# Patient Record
Sex: FEMALE | Race: BLACK OR AFRICAN AMERICAN | NOT HISPANIC OR LATINO | Employment: FULL TIME | URBAN - METROPOLITAN AREA
[De-identification: names, ages, dates, MRNs, and addresses within clinical notes are randomized per-mention and may not be internally consistent; named-entity substitution may affect disease eponyms.]

---

## 2024-09-18 ENCOUNTER — CLINICAL SUPPORT (OUTPATIENT)
Dept: REHABILITATION | Facility: HOSPITAL | Age: 59
End: 2024-09-18
Payer: COMMERCIAL

## 2024-09-18 DIAGNOSIS — M79.604 PAIN IN BOTH LOWER EXTREMITIES: Primary | ICD-10-CM

## 2024-09-18 DIAGNOSIS — M79.605 PAIN IN BOTH LOWER EXTREMITIES: Primary | ICD-10-CM

## 2024-09-18 PROCEDURE — 97016 VASOPNEUMATIC DEVICE THERAPY: CPT

## 2024-09-18 PROCEDURE — 97140 MANUAL THERAPY 1/> REGIONS: CPT

## 2024-09-18 NOTE — PROGRESS NOTES
Physical Therapy Daily Treatment Note     Name: Mary PEREA [facility] Number: [id number]    Therapy Diagnosis:   Encounter Diagnosis   Name Primary?    Pain in both lower extremities Yes     Physician: Alfred Cooper MD    Visit Date: 9/18/2024    Physician: Alfred Cooper MD     Physician Orders: PT Eval and Treat - lymphedema  Medical Diagnosis from Referral: I89.0 (ICD-10-CM) - Lymphedema, not elsewhere classified I87.2 (ICD-10-CM) - Venous insufficiency (chronic) (peripheral)  Evaluation Date: 8/14/2024  Authorization Period Expiration: 12/31/2024  Plan of Care Expiration: 10/14/2024  Visit # / Visits authorized: 1/ 12     Time In: 12:45pm   Time Out: 1:30pm   Total Billable Time: 45 minutes     Precautions: Standard    Subjective     Pt reports: She is doing ok, she can't do the bandaging today     She doesn't have pain, just a numbing and tingling.  She was compliant with home exercise program.  Response to previous treatment: eval only   Functional change: none     Pain: 0/10  Location: bilateral lower legs     Objective       Treatment:   Mary received the following manual therapy techniques:- Manual Lymphatic Drainage were applied to the: BLE for 45 minutes, including: MLD and short stretch compression bandaging       MANUAL LYMPHATIC DRAINAGE (MLD):    While supine with LEs elevated stimulation at terminus, along GI region, B inguinal regions, drainage of entire R LE mayo lower leg, ankle, and foot with return proximally,  Use of Aquaphor due to dryness.   Educated in self massage to abdominal areas, B inguinal areas, thigh, and remaining LE within reach.    SEQUENTIAL COMPRESSION PUMP: full leg sleeve applied to L LE  Biotab with default setting with distal pressures starting at 45mmHg entire LE x 45 minutes    MULTILAYERED BANDAGING:  not performed       Home Exercises Provided and Patient Education Provided     Education provided:     PATIENT/FAMILY Education: bandaging wear schedule,   HEP,  Beginning of self massage,  Self or assisted bandaging, compression options, and Risk reduction    Written Home Exercises Provided: Patient instructed to cont prior HEP.  Exercises were reviewed and Mary was able to demonstrate them prior to the end of the session.  Mary demonstrated good  understanding of the education provided.       Assessment     Pt presents with increased swelling of the LLE. Pt's LLE was pumped and RLE was massaged. Pt is interested in obtaining thigh high compression so orders will be requested to pt's MD. Will continue to progress       Mary Is progressing well towards her goals.   Pt prognosis is Good.     Pt will continue to benefit from skilled outpatient physical therapy to address the deficits listed in the problem list box on initial evaluation, provide pt/family education and to maximize pt's level of independence in the home and community environment.     Pt's spiritual, cultural and educational needs considered and pt agreeable to plan of care and goals.     Anticipated barriers to physical therapy: none     Goals:     Short Term Goals: (6 weeks)  1. Patient will show decreased girth in B LE by up to 1 cm to allow for LE symmetry, shoe and clothing choice, and ability to apply needed compression.  (progressing, not met)   2. Patient will demonstrate 100% knowledge of lymphedema precautions and signs of infection to allow for reduced lymphedema risk, infection risk, and/or exacerbation of condition.  (progressing, not met)  3. Patient or caregiver will perform self-bandaging techniques and/or wearing of compression garments to allow for lymphatic drainage support, skin elasticity, and reduction in shape and size of limb. (progressing, not met)  4. Patient will perform self lymph drainage techniques to areas within reach to enhance lymphatic drainage and skin condition.  (progressing, not met)  5. Patient will tolerate daily activities with multilayered bandaging to allow  for lymphatic and venous support.  (progressing, not met)     Long Term Goals: (12  weeks)  1. Patient will show decreased girth in B LE by up to 2 cm  to allow for LE symmetry, shoe and clothing choice, and ability to apply needed compression daily.  (progressing, not met)  2. Patient will show reduction in density to mild or less with improved contour of limb to allow for cosmesis, LE symmetry, infection risk reduction, and clothing and compression choice.   (progressing, not met)  3. Patient to george/doff compression garment with daily compliance to assist in lymphedema management, skin elasticity, and tissue density.  (progressing, not met)  4. Pt to show improved postural awareness and alignment.  (progressing, not met)  5. Pt to be I and compliant with HEP to allow for increased function in affected limb.   (progressing, not met)    Plan   Continue PT  2x   weekly for Complete Decongestive Therapy:  Manual lymphatic drainage, Multilayered short stretch bandaging, Pneumatic compression, Therapeutic exercises, Patient education as deemed necessary to achieve stated goals.      Shirley Jeffers, PT

## 2024-09-24 ENCOUNTER — CLINICAL SUPPORT (OUTPATIENT)
Dept: REHABILITATION | Facility: HOSPITAL | Age: 59
End: 2024-09-24
Payer: COMMERCIAL

## 2024-09-24 DIAGNOSIS — M79.605 PAIN IN BOTH LOWER EXTREMITIES: Primary | ICD-10-CM

## 2024-09-24 DIAGNOSIS — M79.604 PAIN IN BOTH LOWER EXTREMITIES: Primary | ICD-10-CM

## 2024-09-24 PROCEDURE — 29581 APPL MULTLAYER CMPRN SYS LEG: CPT

## 2024-09-24 PROCEDURE — 97140 MANUAL THERAPY 1/> REGIONS: CPT

## 2024-09-24 PROCEDURE — 97016 VASOPNEUMATIC DEVICE THERAPY: CPT

## 2024-09-25 ENCOUNTER — CLINICAL SUPPORT (OUTPATIENT)
Dept: REHABILITATION | Facility: HOSPITAL | Age: 59
End: 2024-09-25
Payer: COMMERCIAL

## 2024-09-25 DIAGNOSIS — M79.604 PAIN IN BOTH LOWER EXTREMITIES: Primary | ICD-10-CM

## 2024-09-25 DIAGNOSIS — M79.605 PAIN IN BOTH LOWER EXTREMITIES: Primary | ICD-10-CM

## 2024-09-25 PROCEDURE — 97016 VASOPNEUMATIC DEVICE THERAPY: CPT

## 2024-09-25 PROCEDURE — 97140 MANUAL THERAPY 1/> REGIONS: CPT

## 2024-09-25 PROCEDURE — 29581 APPL MULTLAYER CMPRN SYS LEG: CPT

## 2024-09-25 NOTE — PROGRESS NOTES
Physical Therapy Daily Treatment Note     Name: Mary PEREA [facility] Number: [id number]    Therapy Diagnosis:   Encounter Diagnosis   Name Primary?    Pain in both lower extremities Yes       Physician: Alfred Cooper MD    Visit Date: 9/25/2024    Physician: Alfred Cooper MD     Physician Orders: PT Eval and Treat - lymphedema  Medical Diagnosis from Referral: I89.0 (ICD-10-CM) - Lymphedema, not elsewhere classified I87.2 (ICD-10-CM) - Venous insufficiency (chronic) (peripheral)  Evaluation Date: 8/14/2024  Authorization Period Expiration: 12/31/2024  Plan of Care Expiration: 10/14/2024  Visit # / Visits authorized: 3/ 12     Time In: 1:20pm  Time Out: 2:17pm  Total Billable Time: 57 minutes     Precautions: Standard    Subjective     Pt reports: Did ok with bandaging     She doesn't have pain, just a numbing and tingling.  She was compliant with home exercise program.  Response to previous treatment: eval only   Functional change: none     Pain: 0/10  Location: bilateral lower legs     Objective       Treatment:   Mary received the following manual therapy techniques:- Manual Lymphatic Drainage were applied to the: BLE for 57 minutes, including: MLD and short stretch compression bandaging       MANUAL LYMPHATIC DRAINAGE (MLD):    While supine with LEs elevated stimulation at terminus, along GI region, B inguinal regions, drainage of entire L LE mayo lower leg, ankle, and foot with return proximally,  Use of Aquaphor due to dryness.   Educated in self massage to abdominal areas, B inguinal areas, thigh, and remaining LE within reach.    SEQUENTIAL COMPRESSION PUMP: full leg sleeve applied to R LE  Biotab with default setting with distal pressures starting at 45mmHg entire LE x 45 minutes    MULTILAYERED BANDAGING:  issued supplies and bandaged L LE with cotton stockinette, , 2 cellona rolls ankle to thigh, 1-8cm and 2- 10cm Rosidal K rolls foot to thigh, to leave intact 12-24 hrs as tolerated,  discontinue with any problems, return rolled bandages next session. Wash and wear schedules confirmed.     Home Exercises Provided and Patient Education Provided     Education provided:     PATIENT/FAMILY Education: bandaging wear schedule,  HEP,  Beginning of self massage,  Self or assisted bandaging, compression options, and Risk reduction    Written Home Exercises Provided: Patient instructed to cont prior HEP.  Exercises were reviewed and Mary was able to demonstrate them prior to the end of the session.  Mary demonstrated good  understanding of the education provided.       Assessment     Pt presents with increased swelling of the LLE. Pt's RLE was pumped and LLE was massaged. Pt is interested in obtaining thigh high compression so orders will be requested to pt's MD. Will continue to progress       Mary Is progressing well towards her goals.   Pt prognosis is Good.     Pt will continue to benefit from skilled outpatient physical therapy to address the deficits listed in the problem list box on initial evaluation, provide pt/family education and to maximize pt's level of independence in the home and community environment.     Pt's spiritual, cultural and educational needs considered and pt agreeable to plan of care and goals.     Anticipated barriers to physical therapy: none     Goals:     Short Term Goals: (6 weeks)  1. Patient will show decreased girth in B LE by up to 1 cm to allow for LE symmetry, shoe and clothing choice, and ability to apply needed compression.  (progressing, not met)   2. Patient will demonstrate 100% knowledge of lymphedema precautions and signs of infection to allow for reduced lymphedema risk, infection risk, and/or exacerbation of condition.  (progressing, not met)  3. Patient or caregiver will perform self-bandaging techniques and/or wearing of compression garments to allow for lymphatic drainage support, skin elasticity, and reduction in shape and size of limb. (progressing,  not met)  4. Patient will perform self lymph drainage techniques to areas within reach to enhance lymphatic drainage and skin condition.  (progressing, not met)  5. Patient will tolerate daily activities with multilayered bandaging to allow for lymphatic and venous support.  (progressing, not met)     Long Term Goals: (12  weeks)  1. Patient will show decreased girth in B LE by up to 2 cm  to allow for LE symmetry, shoe and clothing choice, and ability to apply needed compression daily.  (progressing, not met)  2. Patient will show reduction in density to mild or less with improved contour of limb to allow for cosmesis, LE symmetry, infection risk reduction, and clothing and compression choice.   (progressing, not met)  3. Patient to george/doff compression garment with daily compliance to assist in lymphedema management, skin elasticity, and tissue density.  (progressing, not met)  4. Pt to show improved postural awareness and alignment.  (progressing, not met)  5. Pt to be I and compliant with HEP to allow for increased function in affected limb.   (progressing, not met)    Plan   Continue PT  2x   weekly for Complete Decongestive Therapy:  Manual lymphatic drainage, Multilayered short stretch bandaging, Pneumatic compression, Therapeutic exercises, Patient education as deemed necessary to achieve stated goals.      Shirley Jeffers, PT

## 2024-10-01 ENCOUNTER — PATIENT OUTREACH (OUTPATIENT)
Dept: ADMINISTRATIVE | Facility: HOSPITAL | Age: 59
End: 2024-10-01
Payer: COMMERCIAL

## 2024-10-01 ENCOUNTER — CLINICAL SUPPORT (OUTPATIENT)
Dept: REHABILITATION | Facility: HOSPITAL | Age: 59
End: 2024-10-01
Payer: COMMERCIAL

## 2024-10-01 DIAGNOSIS — M79.605 PAIN IN BOTH LOWER EXTREMITIES: Primary | ICD-10-CM

## 2024-10-01 DIAGNOSIS — I89.0 LYMPHEDEMA: Primary | ICD-10-CM

## 2024-10-01 DIAGNOSIS — M79.604 PAIN IN BOTH LOWER EXTREMITIES: Primary | ICD-10-CM

## 2024-10-01 PROCEDURE — 97016 VASOPNEUMATIC DEVICE THERAPY: CPT

## 2024-10-01 PROCEDURE — 97140 MANUAL THERAPY 1/> REGIONS: CPT

## 2024-10-01 NOTE — PROGRESS NOTES
Physical Therapy Daily Treatment Note/ Progress Note      Name: Mary Elder [facility] Number: [id number]    Therapy Diagnosis:   Encounter Diagnosis   Name Primary?    Pain in both lower extremities Yes         Physician: Alfred Cooper MD    Visit Date: 10/1/2024    Physician: Alfred Cooper MD     Physician Orders: PT Eval and Treat - lymphedema  Medical Diagnosis from Referral: I89.0 (ICD-10-CM) - Lymphedema, not elsewhere classified I87.2 (ICD-10-CM) - Venous insufficiency (chronic) (peripheral)  Evaluation Date: 8/14/2024  Authorization Period Expiration: 12/31/2024  Plan of Care Expiration: 10/14/2024  Visit # / Visits authorized: 4/ 12     Time In: 2:03pm   Time Out: 3:00pm   Total Billable Time: 57 minutes     Precautions: Standard    Subjective     Pt reports: She had to go to work after her last visit with the bandages and she started having the bandages fall around 6:30. When she took off the bandages, her legs looked a little smaller.     She doesn't have pain, just a numbing and tingling.  She was compliant with home exercise program.  Response to previous treatment: eval only   Functional change: none     Pain: 0/10  Location: bilateral lower legs     Objective       10/1/2024:       Treatment:   Mary received the following manual therapy techniques:- Manual Lymphatic Drainage were applied to the: BLE for 57 minutes, including: MLD and short stretch compression bandaging       MANUAL LYMPHATIC DRAINAGE (MLD):    While supine with LEs elevated stimulation at terminus, along GI region, B inguinal regions, drainage of entire L LE mayo lower leg, ankle, and foot with return proximally,  Use of Aquaphor due to dryness.   Educated in self massage to abdominal areas, B inguinal areas, thigh, and remaining LE within reach.    SEQUENTIAL COMPRESSION PUMP: full leg sleeve applied to R LE  Biotab with default setting with distal pressures starting at 45mmHg entire LE x 45  minutes    MULTILAYERED BANDAGING:  not performed     Home Exercises Provided and Patient Education Provided     Education provided:     PATIENT/FAMILY Education: bandaging wear schedule,  HEP,  Beginning of self massage,  Self or assisted bandaging, compression options, and Risk reduction    Written Home Exercises Provided: Patient instructed to cont prior HEP.  Exercises were reviewed and Mary was able to demonstrate them prior to the end of the session.  Mary demonstrated good  understanding of the education provided.       Assessment     Pt presents with increased swelling of the LLE. Pt's RLE was pumped and LLE was massaged. Measurements taken today show excellent reductions of the LLE so far. Pt is interested in obtaining thigh high compression and orders have been sent to fitters.  Will continue to progress       Mary Is progressing well towards her goals.   Pt prognosis is Good.     Pt will continue to benefit from skilled outpatient physical therapy to address the deficits listed in the problem list box on initial evaluation, provide pt/family education and to maximize pt's level of independence in the home and community environment.     Pt's spiritual, cultural and educational needs considered and pt agreeable to plan of care and goals.     Anticipated barriers to physical therapy: none     Goals:     Short Term Goals: (6 weeks)  1. Patient will show decreased girth in B LE by up to 1 cm to allow for LE symmetry, shoe and clothing choice, and ability to apply needed compression.  MET   2. Patient will demonstrate 100% knowledge of lymphedema precautions and signs of infection to allow for reduced lymphedema risk, infection risk, and/or exacerbation of condition.  MET   3. Patient or caregiver will perform self-bandaging techniques and/or wearing of compression garments to allow for lymphatic drainage support, skin elasticity, and reduction in shape and size of limb. (progressing, not met)  4. Patient  will perform self lymph drainage techniques to areas within reach to enhance lymphatic drainage and skin condition.  (progressing, not met)  5. Patient will tolerate daily activities with multilayered bandaging to allow for lymphatic and venous support.  (progressing, not met)     Long Term Goals: (12  weeks)  1. Patient will show decreased girth in B LE by up to 2 cm  to allow for LE symmetry, shoe and clothing choice, and ability to apply needed compression daily.  (progressing, not met)  2. Patient will show reduction in density to mild or less with improved contour of limb to allow for cosmesis, LE symmetry, infection risk reduction, and clothing and compression choice.   (progressing, not met)  3. Patient to george/doff compression garment with daily compliance to assist in lymphedema management, skin elasticity, and tissue density.  (progressing, not met)  4. Pt to show improved postural awareness and alignment.  (progressing, not met)  5. Pt to be I and compliant with HEP to allow for increased function in affected limb.   (progressing, not met)    Plan   Continue PT  2x   weekly for Complete Decongestive Therapy:  Manual lymphatic drainage, Multilayered short stretch bandaging, Pneumatic compression, Therapeutic exercises, Patient education as deemed necessary to achieve stated goals.      Shirley Jeffers, PT

## 2024-10-01 NOTE — PROGRESS NOTES
Physical Therapy Daily Treatment Note     Name: Mary PEREA [facility] Number: [id number]    Therapy Diagnosis:   Encounter Diagnosis   Name Primary?    Pain in both lower extremities Yes     Physician: Alfred Cooper MD    Visit Date: 9/24/2024    Physician: Alfred Cooper MD     Physician Orders: PT Eval and Treat - lymphedema  Medical Diagnosis from Referral: I89.0 (ICD-10-CM) - Lymphedema, not elsewhere classified I87.2 (ICD-10-CM) - Venous insufficiency (chronic) (peripheral)  Evaluation Date: 8/14/2024  Authorization Period Expiration: 12/31/2024  Plan of Care Expiration: 10/14/2024  Visit # / Visits authorized: 2/ 12     Time In: 4:00pm   Time Out: 4:56pm   Total Billable Time: 56 minutes     Precautions: Standard    Subjective     Pt reports: She is doing ok, swelling continued     She doesn't have pain, just a numbing and tingling.  She was compliant with home exercise program.  Response to previous treatment: eval only   Functional change: none     Pain: 0/10  Location: bilateral lower legs     Objective       Treatment:   Mary received the following manual therapy techniques:- Manual Lymphatic Drainage were applied to the: BLE for 45 minutes, including: MLD and short stretch compression bandaging       MANUAL LYMPHATIC DRAINAGE (MLD):    While supine with LEs elevated stimulation at terminus, along GI region, B inguinal regions, drainage of entire L LE mayo lower leg, ankle, and foot with return proximally,  Use of Aquaphor due to dryness.   Educated in self massage to abdominal areas, B inguinal areas, thigh, and remaining LE within reach.    SEQUENTIAL COMPRESSION PUMP: full leg sleeve applied to R LE  Biotab with default setting with distal pressures starting at 45mmHg entire LE x 45 minutes      MULTILAYERED BANDAGING:  issued supplies and bandaged L LE with cotton stockinette, , 2 cellona rolls ankle to knee, 1-8cm and 2- 10cm Durelast rolls foot to thigh, to leave intact 12-24 hrs  as tolerated, discontinue with any problems, return rolled bandages next session. Wash and wear schedules confirmed.       Home Exercises Provided and Patient Education Provided     Education provided:     PATIENT/FAMILY Education: bandaging wear schedule,  HEP,  Beginning of self massage,  Self or assisted bandaging, compression options, and Risk reduction    Written Home Exercises Provided: Patient instructed to cont prior HEP.  Exercises were reviewed and Mary was able to demonstrate them prior to the end of the session.  Mary demonstrated good  understanding of the education provided.       Assessment     Pt presents with increased swelling of the LLE. Pt's LLE was pumped and RLE was massaged. Pt's LLE was bandaged to the thigh. Will continue to progress       Mary Is progressing well towards her goals.   Pt prognosis is Good.     Pt will continue to benefit from skilled outpatient physical therapy to address the deficits listed in the problem list box on initial evaluation, provide pt/family education and to maximize pt's level of independence in the home and community environment.     Pt's spiritual, cultural and educational needs considered and pt agreeable to plan of care and goals.     Anticipated barriers to physical therapy: none     Goals:     Short Term Goals: (6 weeks)  1. Patient will show decreased girth in B LE by up to 1 cm to allow for LE symmetry, shoe and clothing choice, and ability to apply needed compression.  (progressing, not met)   2. Patient will demonstrate 100% knowledge of lymphedema precautions and signs of infection to allow for reduced lymphedema risk, infection risk, and/or exacerbation of condition.  (progressing, not met)  3. Patient or caregiver will perform self-bandaging techniques and/or wearing of compression garments to allow for lymphatic drainage support, skin elasticity, and reduction in shape and size of limb. (progressing, not met)  4. Patient will perform self  lymph drainage techniques to areas within reach to enhance lymphatic drainage and skin condition.  (progressing, not met)  5. Patient will tolerate daily activities with multilayered bandaging to allow for lymphatic and venous support.  (progressing, not met)     Long Term Goals: (12  weeks)  1. Patient will show decreased girth in B LE by up to 2 cm  to allow for LE symmetry, shoe and clothing choice, and ability to apply needed compression daily.  (progressing, not met)  2. Patient will show reduction in density to mild or less with improved contour of limb to allow for cosmesis, LE symmetry, infection risk reduction, and clothing and compression choice.   (progressing, not met)  3. Patient to george/doff compression garment with daily compliance to assist in lymphedema management, skin elasticity, and tissue density.  (progressing, not met)  4. Pt to show improved postural awareness and alignment.  (progressing, not met)  5. Pt to be I and compliant with HEP to allow for increased function in affected limb.   (progressing, not met)    Plan   Continue PT  2x   weekly for Complete Decongestive Therapy:  Manual lymphatic drainage, Multilayered short stretch bandaging, Pneumatic compression, Therapeutic exercises, Patient education as deemed necessary to achieve stated goals.      Shirley Jeffers, PT

## 2024-10-02 ENCOUNTER — CLINICAL SUPPORT (OUTPATIENT)
Dept: REHABILITATION | Facility: HOSPITAL | Age: 59
End: 2024-10-02
Payer: COMMERCIAL

## 2024-10-02 DIAGNOSIS — M79.605 PAIN IN BOTH LOWER EXTREMITIES: Primary | ICD-10-CM

## 2024-10-02 DIAGNOSIS — M79.604 PAIN IN BOTH LOWER EXTREMITIES: Primary | ICD-10-CM

## 2024-10-02 PROCEDURE — 97016 VASOPNEUMATIC DEVICE THERAPY: CPT

## 2024-10-02 PROCEDURE — 29581 APPL MULTLAYER CMPRN SYS LEG: CPT

## 2024-10-02 PROCEDURE — 97140 MANUAL THERAPY 1/> REGIONS: CPT

## 2024-10-08 NOTE — PROGRESS NOTES
Physical Therapy Daily Treatment Note     Name: Mary PEREA [facility] Number: [id number]    Therapy Diagnosis:   Encounter Diagnosis   Name Primary?    Pain in both lower extremities Yes         Physician: Alfred Cooper MD    Visit Date: 10/2/2024    Physician: Alfred Cooper MD     Physician Orders: PT Eval and Treat - lymphedema  Medical Diagnosis from Referral: I89.0 (ICD-10-CM) - Lymphedema, not elsewhere classified I87.2 (ICD-10-CM) - Venous insufficiency (chronic) (peripheral)  Evaluation Date: 8/14/2024  Authorization Period Expiration: 12/31/2024  Plan of Care Expiration: 10/14/2024  Visit # / Visits authorized: 5/ 12     Time In: 12:35pm   Time Out: 1:20pm   Total Billable Time: 45 minutes     Precautions: Standard    Subjective     Pt reports: She has been wearing knee highs often, did fine after last session   She was compliant with home exercise program.  Response to previous treatment: tolerated well   Functional change: none     Pain: 0/10  Location: bilateral lower legs     Objective       10/1/2024:       Treatment:   Mary received the following manual therapy techniques:- Manual Lymphatic Drainage were applied to the: BLE for 45 minutes, including: MLD and short stretch compression bandaging       MANUAL LYMPHATIC DRAINAGE (MLD):    While supine with LEs elevated stimulation at terminus, along GI region, B inguinal regions, drainage of entire L LE mayo lower leg, ankle, and foot with return proximally,  Use of Aquaphor due to dryness.   Educated in self massage to abdominal areas, B inguinal areas, thigh, and remaining LE within reach.    SEQUENTIAL COMPRESSION PUMP: full leg sleeve applied to R LE  Biotab with default setting with distal pressures starting at 45mmHg entire LE x 45 minutes    MULTILAYERED BANDAGING:     issued supplies and bandaged L LE with cotton stockinette, , 2 cellona rolls ankle to thigh, 1-8cm and 2- 10cm and 1-12cm Rosidal K rolls foot to thigh, to leave  intact 12-24 hrs as tolerated, discontinue with any problems, return rolled bandages next session. Wash and wear schedules confirmed.     Home Exercises Provided and Patient Education Provided     Education provided:     PATIENT/FAMILY Education: bandaging wear schedule,  HEP,  Beginning of self massage,  Self or assisted bandaging, compression options, and Risk reduction    Written Home Exercises Provided: Patient instructed to cont prior HEP.  Exercises were reviewed and Mary was able to demonstrate them prior to the end of the session.  Mary demonstrated good  understanding of the education provided.       Assessment     Pt presents with increased swelling of the LLE. Pt's RLE was pumped and LLE was massaged. Pt is interested in obtaining thigh high compression and orders have been sent to fitters.  Patient's LLE was bandaged to the thigh to address swelling of the LLE. Edemawear size L was applied to the thigh to try to assist with longer fit. Will continue to progress       Mary Is progressing well towards her goals.   Pt prognosis is Good.     Pt will continue to benefit from skilled outpatient physical therapy to address the deficits listed in the problem list box on initial evaluation, provide pt/family education and to maximize pt's level of independence in the home and community environment.     Pt's spiritual, cultural and educational needs considered and pt agreeable to plan of care and goals.     Anticipated barriers to physical therapy: none     Goals:     Short Term Goals: (6 weeks)  1. Patient will show decreased girth in B LE by up to 1 cm to allow for LE symmetry, shoe and clothing choice, and ability to apply needed compression.  MET   2. Patient will demonstrate 100% knowledge of lymphedema precautions and signs of infection to allow for reduced lymphedema risk, infection risk, and/or exacerbation of condition.  MET   3. Patient or caregiver will perform self-bandaging techniques and/or  wearing of compression garments to allow for lymphatic drainage support, skin elasticity, and reduction in shape and size of limb. (progressing, not met)  4. Patient will perform self lymph drainage techniques to areas within reach to enhance lymphatic drainage and skin condition.  (progressing, not met)  5. Patient will tolerate daily activities with multilayered bandaging to allow for lymphatic and venous support.  (progressing, not met)     Long Term Goals: (12  weeks)  1. Patient will show decreased girth in B LE by up to 2 cm  to allow for LE symmetry, shoe and clothing choice, and ability to apply needed compression daily.  (progressing, not met)  2. Patient will show reduction in density to mild or less with improved contour of limb to allow for cosmesis, LE symmetry, infection risk reduction, and clothing and compression choice.   (progressing, not met)  3. Patient to george/doff compression garment with daily compliance to assist in lymphedema management, skin elasticity, and tissue density.  (progressing, not met)  4. Pt to show improved postural awareness and alignment.  (progressing, not met)  5. Pt to be I and compliant with HEP to allow for increased function in affected limb.   (progressing, not met)    Plan   Continue PT  2x   weekly for Complete Decongestive Therapy:  Manual lymphatic drainage, Multilayered short stretch bandaging, Pneumatic compression, Therapeutic exercises, Patient education as deemed necessary to achieve stated goals.      Shirley Jeffers, PT

## 2024-10-09 ENCOUNTER — CLINICAL SUPPORT (OUTPATIENT)
Dept: REHABILITATION | Facility: HOSPITAL | Age: 59
End: 2024-10-09
Payer: COMMERCIAL

## 2024-10-09 DIAGNOSIS — M79.604 PAIN IN BOTH LOWER EXTREMITIES: Primary | ICD-10-CM

## 2024-10-09 DIAGNOSIS — M79.605 PAIN IN BOTH LOWER EXTREMITIES: Primary | ICD-10-CM

## 2024-10-09 PROCEDURE — 97016 VASOPNEUMATIC DEVICE THERAPY: CPT

## 2024-10-09 PROCEDURE — 97140 MANUAL THERAPY 1/> REGIONS: CPT

## 2024-10-10 ENCOUNTER — CLINICAL SUPPORT (OUTPATIENT)
Dept: REHABILITATION | Facility: HOSPITAL | Age: 59
End: 2024-10-10
Payer: COMMERCIAL

## 2024-10-10 DIAGNOSIS — M79.604 PAIN IN BOTH LOWER EXTREMITIES: Primary | ICD-10-CM

## 2024-10-10 DIAGNOSIS — M79.605 PAIN IN BOTH LOWER EXTREMITIES: Primary | ICD-10-CM

## 2024-10-10 PROCEDURE — 97016 VASOPNEUMATIC DEVICE THERAPY: CPT

## 2024-10-10 PROCEDURE — 29581 APPL MULTLAYER CMPRN SYS LEG: CPT

## 2024-10-10 PROCEDURE — 97140 MANUAL THERAPY 1/> REGIONS: CPT

## 2024-10-14 ENCOUNTER — CLINICAL SUPPORT (OUTPATIENT)
Dept: REHABILITATION | Facility: HOSPITAL | Age: 59
End: 2024-10-14
Payer: COMMERCIAL

## 2024-10-14 DIAGNOSIS — M79.605 PAIN IN BOTH LOWER EXTREMITIES: Primary | ICD-10-CM

## 2024-10-14 DIAGNOSIS — M79.604 PAIN IN BOTH LOWER EXTREMITIES: Primary | ICD-10-CM

## 2024-10-14 PROCEDURE — 29581 APPL MULTLAYER CMPRN SYS LEG: CPT

## 2024-10-14 PROCEDURE — 97140 MANUAL THERAPY 1/> REGIONS: CPT

## 2024-10-14 PROCEDURE — 97016 VASOPNEUMATIC DEVICE THERAPY: CPT

## 2024-10-30 ENCOUNTER — CLINICAL SUPPORT (OUTPATIENT)
Dept: REHABILITATION | Facility: HOSPITAL | Age: 59
End: 2024-10-30
Payer: COMMERCIAL

## 2024-10-30 DIAGNOSIS — M79.605 PAIN IN BOTH LOWER EXTREMITIES: Primary | ICD-10-CM

## 2024-10-30 DIAGNOSIS — M79.604 PAIN IN BOTH LOWER EXTREMITIES: Primary | ICD-10-CM

## 2024-10-30 PROCEDURE — 97140 MANUAL THERAPY 1/> REGIONS: CPT

## 2024-12-05 ENCOUNTER — OFFICE VISIT (OUTPATIENT)
Dept: URGENT CARE | Facility: CLINIC | Age: 59
End: 2024-12-05
Payer: COMMERCIAL

## 2024-12-05 VITALS
DIASTOLIC BLOOD PRESSURE: 83 MMHG | RESPIRATION RATE: 19 BRPM | WEIGHT: 205.69 LBS | TEMPERATURE: 98 F | SYSTOLIC BLOOD PRESSURE: 140 MMHG | OXYGEN SATURATION: 97 % | HEART RATE: 63 BPM | HEIGHT: 66 IN | BODY MASS INDEX: 33.06 KG/M2

## 2024-12-05 DIAGNOSIS — S63.641A SPRAIN OF METACARPOPHALANGEAL (MCP) JOINT OF RIGHT THUMB, INITIAL ENCOUNTER: Primary | ICD-10-CM

## 2024-12-05 PROCEDURE — 73140 X-RAY EXAM OF FINGER(S): CPT | Mod: RT,S$GLB,, | Performed by: RADIOLOGY

## 2024-12-05 RX ORDER — IBUPROFEN 600 MG/1
600 TABLET ORAL EVERY 6 HOURS PRN
Qty: 20 TABLET | Refills: 0 | Status: SHIPPED | OUTPATIENT
Start: 2024-12-05 | End: 2024-12-05

## 2024-12-05 RX ORDER — KETOROLAC TROMETHAMINE 30 MG/ML
30 INJECTION, SOLUTION INTRAMUSCULAR; INTRAVENOUS
Status: COMPLETED | OUTPATIENT
Start: 2024-12-05 | End: 2024-12-05

## 2024-12-05 RX ORDER — IBUPROFEN 800 MG/1
800 TABLET ORAL EVERY 6 HOURS PRN
Qty: 20 TABLET | Refills: 0 | Status: SHIPPED | OUTPATIENT
Start: 2024-12-05

## 2024-12-05 RX ADMIN — KETOROLAC TROMETHAMINE 30 MG: 30 INJECTION, SOLUTION INTRAMUSCULAR; INTRAVENOUS at 02:12

## 2024-12-05 NOTE — PROGRESS NOTES
"Subjective:      Patient ID: Mary Healy is a 58 y.o. female.    Vitals:  height is 5' 6" (1.676 m) and weight is 93.3 kg (205 lb 11 oz). Her oral temperature is 98 °F (36.7 °C). Her blood pressure is 140/83 (abnormal) and her pulse is 63. Her respiration is 19 and oxygen saturation is 97%.     Chief Complaint: Hand Pain    58-year-old female here for right thumb pain that started this morning.  She was trying to pull up her spanx when she felt a pulling sensation on the thumb and immediate pain at the MCP joint.  She states that for the past several months, her thumb will "become displaced" and she has to "pull it back." Has not taken anything for the pain.  Denies any numbness or tingling.    Hand Pain   Her dominant hand is their right hand. The incident occurred 3 to 6 hours ago. The incident occurred at home. The injury mechanism is unknown. The pain is present in the right fingers. The quality of the pain is described as aching. The pain does not radiate. The pain is at a severity of 10/10. The pain is severe. The pain has been Constant since the incident. Nothing aggravates the symptoms. She has tried nothing for the symptoms. The treatment provided no relief.       Constitution: Negative for chills and fever.   Respiratory:  Negative for cough.    Musculoskeletal:  Positive for joint pain. Negative for joint swelling.   Skin:  Negative for bruising.      Objective:     Physical Exam   Constitutional: She is oriented to person, place, and time. She appears well-developed.   HENT:   Head: Normocephalic and atraumatic.   Ears:   Right Ear: External ear normal.   Left Ear: External ear normal.   Nose: Nose normal.   Mouth/Throat: Oropharynx is clear and moist.   Eyes: Conjunctivae, EOM and lids are normal.   Neck: Trachea normal and phonation normal. Neck supple.   Musculoskeletal: Normal range of motion.         General: Normal range of motion.        Hands:    Neurological: She is alert and oriented to " person, place, and time.   Skin: Skin is warm, dry and intact.   Psychiatric: Her speech is normal and behavior is normal. Judgment and thought content normal.   Nursing note and vitals reviewed.    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  No fracture or soft tissue swelling.     See above Electronically signed by: Dimitry Roberson Jr Date:    12/05/2024 Time:    15:00       Assessment:     1. Sprain of metacarpophalangeal (MCP) joint of right thumb, initial encounter        Plan:       Sprain of metacarpophalangeal (MCP) joint of right thumb, initial encounter  -     ketorolac injection 30 mg  -     X-Ray Finger 2 or More Views Right; Future; Expected date: 12/05/2024  -     THUMB ORTHOSIS SPLINT UNIVERSAL FOR HOME USE  -     Discontinue: ibuprofen (ADVIL,MOTRIN) 600 MG tablet; Take 1 tablet (600 mg total) by mouth every 6 (six) hours as needed for Pain.  Dispense: 20 tablet; Refill: 0  -     Ambulatory referral/consult to Orthopedics  -     ibuprofen (ADVIL,MOTRIN) 800 MG tablet; Take 1 tablet (800 mg total) by mouth every 6 (six) hours as needed for Pain.  Dispense: 20 tablet; Refill: 0    X-ray shows no acute fracture or dislocation.  Will treat for sprain of MCP joint of right thumb.  NSAIDs for pain.  Patient given thumb spica splint for support and comfort.  Referral to orthopedics to follow up as needed if no improvement.          Patient Instructions   Ibuprofen as needed for pain.    Keep your the thumb in the thumb spica splint as needed for support and comfort.  Attention not to aggravate area    - Follow up with orthopedics in the next 1-2 weeks if not improved or as needed.  You can call [phone] to schedule an appointment with the appropriate provider.     - Go to the ER or seek medical attention immediately if you develop new or worsening symptoms.    - You must understand that you have received an Urgent Care treatment only and that you may be released before all of your medical problems are known or treated.   - You, the patient, will arrange for follow up care as instructed.   - If your condition worsens or fails to improve we recommend that you receive another evaluation at the ER immediately or contact your PCP to discuss your concerns or return here.

## 2024-12-05 NOTE — PATIENT INSTRUCTIONS
Ibuprofen as needed for pain.    Keep your the thumb in the thumb spica splint as needed for support and comfort.  Attention not to aggravate area    - Follow up with orthopedics in the next 1-2 weeks if not improved or as needed.  You can call [phone] to schedule an appointment with the appropriate provider.    - Go to the ER or seek medical attention immediately if you develop new or worsening symptoms.    - You must understand that you have received an Urgent Care treatment only and that you may be released before all of your medical problems are known or treated.   - You, the patient, will arrange for follow up care as instructed.   - If your condition worsens or fails to improve we recommend that you receive another evaluation at the ER immediately or contact your PCP to discuss your concerns or return here.

## 2024-12-11 ENCOUNTER — TELEPHONE (OUTPATIENT)
Dept: ORTHOPEDICS | Facility: CLINIC | Age: 59
End: 2024-12-11
Payer: COMMERCIAL

## 2024-12-12 ENCOUNTER — OFFICE VISIT (OUTPATIENT)
Dept: ORTHOPEDICS | Facility: CLINIC | Age: 59
End: 2024-12-12
Payer: COMMERCIAL

## 2024-12-12 DIAGNOSIS — M77.8 THUMB TENDONITIS: Primary | ICD-10-CM

## 2024-12-12 PROCEDURE — 99204 OFFICE O/P NEW MOD 45 MIN: CPT | Mod: S$GLB,,, | Performed by: SPECIALIST/TECHNOLOGIST

## 2024-12-12 PROCEDURE — 1159F MED LIST DOCD IN RCRD: CPT | Mod: CPTII,S$GLB,, | Performed by: SPECIALIST/TECHNOLOGIST

## 2024-12-12 PROCEDURE — 1160F RVW MEDS BY RX/DR IN RCRD: CPT | Mod: CPTII,S$GLB,, | Performed by: SPECIALIST/TECHNOLOGIST

## 2024-12-12 PROCEDURE — 99999 PR PBB SHADOW E&M-EST. PATIENT-LVL III: CPT | Mod: PBBFAC,,, | Performed by: SPECIALIST/TECHNOLOGIST

## 2024-12-12 NOTE — PROGRESS NOTES
Patient ID: Mary Healy is a 59 y.o. female.    Chief Complaint: Follow-up of the Right Hand    History of Present Illness    CHIEF COMPLAINT:  - Mary presents for evaluation of right thumb pain and possible dislocation that occurred last week.    HPI:  Mary presents with a complaint of right thumb pain that occurred last week, believing her thumb may have been dislocated. She reports this has occurred previously, usually adjusting it herself, but this time she was unable to. Mary describes the initial pain as severe.    She sought care at an urgent care facility on the [facility], where x-rays were taken. The urgent care provider reported no visible dislocation or fracture. She received a Toradol injection for pain and was prescribed ibuprofen. She was given a brace, which increased her pain to 30 on a 0-10 scale. Mary then obtained a different brace from [employer], which has helped reduce the pain.    She reports that the pain usually occurs when putting on compression garments like Spanx or compression socks. She indicates the pain location in the thumb area.    Currently, the patient denies being in pain, attributing this to either the Toradol injection, the brace she wears daily, or a combination of both. She has been constantly massaging the area.    She denies any current pain, numbness, or tingling in the affected thumb. She denies any family history of gout.    MEDICATIONS:  - Toradol: Given as a shot at urgent care for pain relief  - Ibuprofen: Prescribed by urgent care for pain management. She is not taking it regularly    WORK STATUS:  - Works as a   - Job requires extensive use of hands  - Condition likely related to work due to repetitive hand movements and occasional forceful actions      ROS:  ROS as indicated in HPI.          Hand/Wrist Musculoskeletal Exam    Inspection    Right      Erythema: none      Ecchymosis: none      Edema: none      Deformity: none    Left       Erythema: none      Ecchymosis: none      Edema: none      Deformity: none    Palpation    Palpation additional comments: Tenderness to palpation of the dorsum of the MCP joint of the right thumb.    Range of Motion        Range of motion additional comments: Able to make a composite fist.    Neurovascular    Right       Radial pulse: normal      Capillary refill: brisk      Ulnar nerve sensory distribution: normal      Median nerve sensory distribution: normal      Superficial radial nerve sensory distribution: normal    Left       Radial pulse: normal      Capillary refill: brisk      Ulnar nerve sensory distribution: normal      Median nerve sensory distribution: normal      Superficial radial nerve sensory distribution: normal    Neurovascular additional comments:       Special Tests    Special tests additional comments: No laxity present compared bilaterally of the MCP joint of the UCL and RCL ligament tested at 0 and 30°.      Physical Exam    MSK: Hand/Wrist - Right: Right thumb ligament intact. Full function of the right thumb.  IMAGING:  - X-rays thumb: Normal with no evidence of fracture or dislocation           IMAGING  Right Thumb XR  Personal interpretation of the XR reveals no signs of fractures or dislocations      PLAN  Assessment & Plan    - Referred to physical therapy for tendon glides, strengthening exercises, and other modalities such as ultrasound, paraffin wax massage to address thumb tendonitis.  - Follow up in 6 weeks if therapy is not improving symptoms.            Feroz Draper PA-C, ATC  Hand and Upper Extremity   [employer]    This note was generated with the assistance of ambient listening technology. Verbal consent was obtained by the patient and accompanying visitor(s) for the recording of patient appointment to facilitate this note. I attest to having reviewed and edited the generated note for accuracy, though some syntax or spelling errors may persist. Please contact the author of  this note for any clarification.

## 2024-12-30 ENCOUNTER — OFFICE VISIT (OUTPATIENT)
Dept: INTERNAL MEDICINE | Facility: CLINIC | Age: 59
End: 2024-12-30
Payer: COMMERCIAL

## 2024-12-30 VITALS
BODY MASS INDEX: 32.95 KG/M2 | HEIGHT: 66 IN | WEIGHT: 205 LBS | OXYGEN SATURATION: 100 % | HEART RATE: 57 BPM | SYSTOLIC BLOOD PRESSURE: 126 MMHG | DIASTOLIC BLOOD PRESSURE: 78 MMHG

## 2024-12-30 DIAGNOSIS — Z13.1 SCREENING FOR DIABETES MELLITUS: ICD-10-CM

## 2024-12-30 DIAGNOSIS — Z13.220 ENCOUNTER FOR LIPID SCREENING FOR CARDIOVASCULAR DISEASE: ICD-10-CM

## 2024-12-30 DIAGNOSIS — Z13.6 ENCOUNTER FOR LIPID SCREENING FOR CARDIOVASCULAR DISEASE: ICD-10-CM

## 2024-12-30 DIAGNOSIS — Z76.89 ENCOUNTER TO ESTABLISH CARE: Primary | ICD-10-CM

## 2024-12-30 DIAGNOSIS — I87.2 VENOUS INSUFFICIENCY: ICD-10-CM

## 2024-12-30 DIAGNOSIS — Z86.19 HISTORY OF SHINGLES: ICD-10-CM

## 2024-12-30 DIAGNOSIS — I89.0 LYMPHEDEMA: ICD-10-CM

## 2024-12-30 DIAGNOSIS — I10 HYPERTENSION, WELL CONTROLLED: ICD-10-CM

## 2024-12-30 DIAGNOSIS — Z12.11 SCREEN FOR COLON CANCER: ICD-10-CM

## 2024-12-30 DIAGNOSIS — Z00.00 LABORATORY EXAMINATION ORDERED AS PART OF A ROUTINE GENERAL MEDICAL EXAMINATION: ICD-10-CM

## 2024-12-30 DIAGNOSIS — M79.672 LEFT FOOT PAIN: ICD-10-CM

## 2024-12-30 DIAGNOSIS — Z12.31 BREAST CANCER SCREENING BY MAMMOGRAM: ICD-10-CM

## 2024-12-30 PROCEDURE — 3008F BODY MASS INDEX DOCD: CPT | Mod: CPTII,S$GLB,, | Performed by: INTERNAL MEDICINE

## 2024-12-30 PROCEDURE — 3074F SYST BP LT 130 MM HG: CPT | Mod: CPTII,S$GLB,, | Performed by: INTERNAL MEDICINE

## 2024-12-30 PROCEDURE — 99999 PR PBB SHADOW E&M-EST. PATIENT-LVL IV: CPT | Mod: PBBFAC,,, | Performed by: INTERNAL MEDICINE

## 2024-12-30 PROCEDURE — 1159F MED LIST DOCD IN RCRD: CPT | Mod: CPTII,S$GLB,, | Performed by: INTERNAL MEDICINE

## 2024-12-30 PROCEDURE — 1160F RVW MEDS BY RX/DR IN RCRD: CPT | Mod: CPTII,S$GLB,, | Performed by: INTERNAL MEDICINE

## 2024-12-30 PROCEDURE — 3078F DIAST BP <80 MM HG: CPT | Mod: CPTII,S$GLB,, | Performed by: INTERNAL MEDICINE

## 2024-12-30 PROCEDURE — 99396 PREV VISIT EST AGE 40-64: CPT | Mod: S$GLB,,, | Performed by: INTERNAL MEDICINE

## 2024-12-30 RX ORDER — HYDROCHLOROTHIAZIDE 12.5 MG/1
12.5 TABLET ORAL DAILY
Qty: 90 TABLET | Refills: 3 | Status: SHIPPED | OUTPATIENT
Start: 2024-12-30

## 2024-12-30 RX ORDER — DICLOFENAC SODIUM 10 MG/G
GEL TOPICAL 2 TIMES DAILY
Qty: 200 G | Refills: 2 | Status: SHIPPED | OUTPATIENT
Start: 2024-12-30

## 2024-12-30 NOTE — PROGRESS NOTES
Subjective:       Patient ID: Mary Healy is a 59 y.o. female.    Chief Complaint: Establish Care      HPI  Mary Healy is a 59 y.o. year old female with HTN presents for establishment of care.     Review of Systems   Constitutional:  Negative for activity change, appetite change, fatigue, fever and unexpected weight change.   HENT:  Negative for congestion, hearing loss, postnasal drip, sneezing, sore throat, trouble swallowing and voice change.    Eyes:  Negative for pain and discharge.   Respiratory:  Negative for cough, choking, chest tightness, shortness of breath and wheezing.    Cardiovascular:  Negative for chest pain, palpitations and leg swelling.   Gastrointestinal:  Negative for abdominal distention, abdominal pain, blood in stool, constipation, diarrhea, nausea and vomiting.   Endocrine: Negative for polydipsia and polyuria.   Genitourinary:  Negative for difficulty urinating and flank pain.   Musculoskeletal:  Negative for arthralgias, back pain, joint swelling, myalgias and neck pain.   Skin:  Negative for rash.   Neurological:  Negative for dizziness, tremors, seizures, weakness, numbness and headaches.   Psychiatric/Behavioral:  Negative for agitation. The patient is not nervous/anxious.          Past Medical History:   Diagnosis Date    Hypertension         Prior to Admission medications    Medication Sig Start Date End Date Taking? Authorizing Provider   gabapentin (NEURONTIN) 300 MG capsule Take 1 capsule (300 mg total) by mouth 3 (three) times daily. 7/24/24  Yes Nakul New MD   ibuprofen (ADVIL,MOTRIN) 800 MG tablet Take 1 tablet (800 mg total) by mouth every 6 (six) hours as needed for Pain. 12/5/24  Yes Dimitry Stephenson PA-C   triamcinolone acetonide 0.1% (KENALOG) 0.1 % ointment Apply topically 2 (two) times daily. 12/14/20  Yes Mignon Yanes MD   diclofenac sodium (VOLTAREN) 1 % Gel 4 grams to left foot QID- PRN for pain 11/29/22 12/30/24 Yes Chris Rhoades  "KOFFI Oakley MD   hydroCHLOROthiazide (HYDRODIURIL) 12.5 MG Tab TAKE 1 TABLET(12.5 MG) BY MOUTH EVERY DAY 12/7/23 12/30/24 Yes Mercedes Rivas MD   diclofenac sodium (VOLTAREN) 1 % Gel Apply topically 2 (two) times daily. 12/30/24   Simon Wick MD   hydroCHLOROthiazide (HYDRODIURIL) 12.5 MG Tab Take 1 tablet (12.5 mg total) by mouth once daily. 12/30/24   Simon Wick MD   HYDROcodone-acetaminophen (NORCO) 5-325 mg per tablet Take 1 tablet by mouth every 8 (eight) hours as needed for Pain.  Patient not taking: Reported on 8/5/2024 6/22/24   Meredith Quesada PA-C   valACYclovir (VALTREX) 1000 MG tablet Take 1 tablet (1,000 mg total) by mouth 3 (three) times daily. for 7 days 6/22/24 6/29/24  Meredith Quesada PA-C        Past medical history, surgical history, and family medical history reviewed and updated as appropriate.    Medications and allergies reviewed.     Objective:          Vitals:    12/30/24 1349   BP: 126/78   BP Location: Right arm   Patient Position: Sitting   Pulse: (!) 57   SpO2: 100%   Weight: 93 kg (205 lb 0.4 oz)   Height: 5' 6" (1.676 m)     Body mass index is 33.09 kg/m².  Physical Exam  Constitutional:       Appearance: She is well-developed.   HENT:      Head: Normocephalic and atraumatic.   Eyes:      Extraocular Movements: Extraocular movements intact.   Cardiovascular:      Rate and Rhythm: Normal rate and regular rhythm.      Heart sounds: Normal heart sounds.   Pulmonary:      Effort: Pulmonary effort is normal. No respiratory distress.      Breath sounds: Normal breath sounds. No wheezing.   Abdominal:      General: Bowel sounds are normal. There is no distension.      Palpations: Abdomen is soft.      Tenderness: There is no abdominal tenderness.   Musculoskeletal:         General: No tenderness. Normal range of motion.      Cervical back: Normal range of motion.   Skin:     General: Skin is warm and dry.   Neurological:      Mental Status: She is alert and oriented to " person, place, and time.      Cranial Nerves: No cranial nerve deficit.      Deep Tendon Reflexes: Reflexes are normal and symmetric.         Lab Results   Component Value Date    WBC 3.52 (L) 12/14/2022    HGB 12.2 12/14/2022    HCT 39.1 12/14/2022     12/14/2022    CHOL 184 12/14/2022    TRIG 46 12/14/2022    HDL 50 12/14/2022    ALT 8 (L) 10/09/2023    AST 13 10/09/2023     10/09/2023    K 4.0 10/09/2023     10/09/2023    CREATININE 0.9 10/09/2023    BUN 18 10/09/2023    CO2 26 10/09/2023    TSH 1.615 12/14/2022    HGBA1C 6.2 (H) 10/09/2023       Assessment:       1. Venous insufficiency    2. Left foot pain    3. Hypertension, well controlled    4. Lymphedema    5. Breast cancer screening by mammogram    6. Screen for colon cancer due 2028    7. Laboratory examination ordered as part of a routine general medical examination    8. Screening for diabetes mellitus    9. Encounter for lipid screening for cardiovascular disease    10. History of shingles          Plan:     Mary was seen today for establish care.    Diagnoses and all orders for this visit:    Encounter to establish care    Left foot pain  -     diclofenac sodium (VOLTAREN) 1 % Gel; Apply topically 2 (two) times daily.    Hypertension, well controlled  -     hydroCHLOROthiazide (HYDRODIURIL) 12.5 MG Tab; Take 1 tablet (12.5 mg total) by mouth once daily.  -     CBC Auto Differential; Future  -     Comprehensive Metabolic Panel; Future  -     TSH; Future    Venous insufficiency    Lymphedema    Breast cancer screening by mammogram  -     Mammo Digital Screening Bilat; Future    Screen for colon cancer due 2028    Laboratory examination ordered as part of a routine general medical examination  -     CBC Auto Differential; Future  -     Comprehensive Metabolic Panel; Future  -     TSH; Future  -     Hemoglobin A1C; Future  -     Lipid Panel; Future    Screening for diabetes mellitus  -     Hemoglobin A1C; Future    Encounter for lipid  screening for cardiovascular disease  -     Lipid Panel; Future    History of shingles        Health maintenance reviewed with patient.     Follow up in about 1 year (around 12/30/2025).    Simon Wick MD  Internal Medicine / Primary Care  [facility]  12/30/2024

## 2024-12-30 NOTE — PATIENT INSTRUCTIONS
"Schedule fasting labwork  Schedule mammogram after 2/28/2025    Reminder to get your vaccinations - flu, covid-19, shingles    Shingles vaccination at your convenience - "Shingrix" two shot series, 2-6 months apart. Can get here at [facility] or at any local pharmacy.     Refills of your HCTZ and voltaren gel sent to your pharmacy.     Return to clinic in 1 year or sooner if needed.   "

## 2025-01-03 NOTE — PROGRESS NOTES
"                                     [facility]  Occupational Therapy Initial Evaluation     Name: Mary PEREA [facility] (formerly [facility]3] Number: [id number]    Therapy Diagnosis:   Encounter Diagnosis   Name Primary?    Thumb tendonitis Yes     Physician: Luther Draper PA-C    Physician Orders: Eval and Treat  Medical Diagnosis: M77.8 (ICD-10-CM) - Thumb tendonitis   Date/Mechanism of Injury: Injury to R thumb first week of December 2024  Evaluation Date: 1/6/2025  Insurance Authorization Period Expiration: TBD  Plan of Care Certification Period: ***  Date of Return to MD: TBD  Visit # / Visits authorized: 1/1   FOTO: Initial evaluation/***    Precautions:  Standard    Time In:***  Time Out: ***  Total Appointment Time (timed & untimed codes): *** minutes    Subjective     Date of Onset: ***    History of Current Condition/Mechanism of Injury: Mary reports: ***    Falls: ***    Involved Side: ***  Dominant Side: { hand dominance:8176831382}    Mechanism of Injury: ***  Surgical Procedure: ***  Imaging: R hand 12/5/24 X-Ray  "EXAMINATION:  XR FINGER 2 OR MORE VIEWS RIGHT     CLINICAL HISTORY:  R thumb pain at MCP joint x1 day.; Pain in right finger(s)     TECHNIQUE:  XR FINGER 2 OR MORE VIEWS RIGHT     COMPARISON:  None     FINDINGS:  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  No fracture or soft tissue swelling."    Prior Therapy: ***    Pain:  Functional Pain Scale Rating 0-10:   {NUMBERS; 0-10:5044}/10 on average  {NUMBERS; 0-10:5044}/10 at best  {NUMBERS; 0-10:5044}/10 at worst  Location: ***  Description: {Pain Description:50043}  Aggravating Factors: {Causes; Pain:94644}  Easing Factors: {Pain (activities that relieve):70728}    Occupation:  ***  Working presently: {Work history:24393}  Duties: ***    Functional Limitations/Social History:    Previous functional status includes: Independent with all ADLs. ***    Current Functional Status   Home/Living environment: {LIVES WITH:58303}    - *** [place], *** " steps to enter    - DME: ***      Limitation of Functional Status as follows:   ADLs/IADLs:     - Feeding: ***    - Bathing: ***    - Dressing/Grooming: ***    - Home Management: ***    - Driving: ***     Leisure: {AMB OT HAND LEISURE:40347}    Patient's Goals for Therapy: ***    Past Medical History/Physical Systems Review:   Mary Healy  has a past medical history of Hypertension.    Mary Healy  has a past surgical history that includes Tubal ligation (Bilateral, 2007) and Colonoscopy (N/A, 3/19/2018).    Mary has a current medication list which includes the following prescription(s): diclofenac sodium, gabapentin, hydrochlorothiazide, ibuprofen, triamcinolone acetonide 0.1%, and valacyclovir.    Review of patient's allergies indicates:  No Known Allergies     Objective     Observation/Appearance:  {AMB OT HAND OBSERVATION:69091} and ***    Edema. Measured in centimeters.   1/6/2025 1/6/2025    Right  Left    2in. Above elbow *** ***   2in. Below elbow *** ***   Proximal Wrist Crease *** ***   Figure of 8 *** ***   MCPs *** ***     Edema. Measured in centimeters.   1/6/2025 1/6/2025    Right  Left    Index:       P1 *** ***    PIP *** ***   P2 *** ***    DIP *** ***   P3 *** ***   Long:       P1 *** ***    PIP *** ***   P2 *** ***    DIP *** ***   P3 *** ***   Ring:       P1            *** ***    PIP            *** ***   P2             *** ***    DIP *** ***   P3 *** ***   Small:        P1           *** ***     PIP        *** ***   P2        *** ***    DIP *** ***   P3 *** ***   Thumb:     Prox. Phalanx *** ***   IP *** ***   Distal Phalanx *** ***     Hand/Wrist ROM. Measured in degrees.   1/6/2025 1/6/2025    Right  Left         Index: MP  ***/*** ***/***              PIP     ***/*** ***/***              DIP ***/*** ***/***              LAGUNA *** ***        Long:  MP ***/*** ***/***              PIP ***/*** ***/***              DIP ***/*** ***/***              LAGUNA          Ring:   MP ***/***  ***/***              PIP ***/*** ***/***              DIP ***/*** ***/***              LAGUNA *** ***        Small:  MP ***/*** ***/***               PIP ***/*** ***/***               DIP ***/*** ***/***              LAGUNA *** ***        Thumb: MP ***/*** ***/***                IP ***/*** ***/***       Rad ADD/ABD *** ***       Pal ADD/ABD *** ***          Opposition *** ***     Wrist Flex/Ext: ***/***  Wrist RD/UD: ***/***      Sensation  {AMB OT SENSATION:66609} Distribution 1/6/2025 1/6/2025    Right  Left    [place] Doug     Normal 1.65-2.83     Diminished Light Touch 3.22-3.61     Diminished Protective 3.84-4.31     Loss of Protective 4.56-6.65     Untestable >6.65     2 Point Discrimination     Static     Dynamic       Special Tests:   {Right/Left:15494}   1/6/2025   Thumb CMC Grind Test    Finkelstein's Test    Phalen's Test    Tinel's Test    Perry's Test    Extrinsic Tightness Test    Intrinsic Tightness Test    ORL Test    Froment's Sign    Thelma's Sign     Scaphoid Thrust Test    Linscheid's Test    Metacarpal Stress Test    Piano Key Test    Ulnar Compression Test    TFCC Load Test    Ulnocarpal Stress Test    Midcarpal Shift Test         Strength (Dyanmometer) and Pinch Strength (Pinch Gauge)  Measured in pounds and psi. Average of three trials.   1/6/2025 1/6/2025    Right  Left    Rung II     Molina Pinch     3pt Pinch     2pt Pinch         Manual Muscle Testing    Median Innervated:  Muscle Action 1/6/2025   Pronator Teres C6-7 Pronation {AMB OT SHOULDER STRENGTH:04342}   Flexor Carpi Radialis C6-7 Wrist flex/rd {AMB OT SHOULDER STRENGTH:37595}   Palmaris Longus C7-8 Wrist flexion {AMB OT SHOULDER STRENGTH:63233}   Flexor Digitorum Superficials C7-T1 Index PIP flexion {AMB OT SHOULDER STRENGTH:57562}   Flexor Digitorum Superficials C7-T1 Long PIP flexion {AMB OT SHOULDER STRENGTH:81362}   Flexor Digitorum Superficials C7-T1 Ring PIP flexion {AMB OT SHOULDER STRENGTH:32392}   Flexor Digitorum  Superficials C7-T1 Small PIP flexion {AMB OT SHOULDER STRENGTH:55800}   Anterior Interosseus Branch     Flexor Digitorum Profundus C7-T1 Index DIP Flexion {AMB OT SHOULDER STRENGTH:63191}   Flexor Digitorum Profundus C7-T1 ILong DIP Flexion {AMB OT SHOULDER STRENGTH:15136}   Flexor Longus C8 -T1 IP Flexion {AMB OT SHOULDER STRENGTH:33605}   Pronator Quadratus Pronation {AMB OT SHOULDER STRENGTH:77253}   Digital Branch(Recurrent)     Abductor Pollicis Brevis C6-T1 MP Flexion {AMB OT SHOULDER STRENGTH:85921}   Opponens Pollicis C6-T1 Opposition {AMB OT SHOULDER STRENGTH:16208}   Flexor Pollicis Brevis(suprfl head) C7- T1 MP flexion {AMB OT SHOULDER STRENGTH:53667}   Lumbricals C7-T1 Index MP flexion/IP ext {AMB OT SHOULDER STRENGTH:66757}   Lumbricals C7-T1 Long MP flexion/IP ext {AMB OT SHOULDER STRENGTH:58794}     Ulnar Innervated:  Muscle Action 1/6/2025   Flexor Carpi Ulnaris C7-T1 Wrist flex/ud {AMB OT SHOULDER STRENGTH:57776}   Flexor Digitorum Profundus C8-T1 RIng DIP flex {AMB OT SHOULDER STRENGTH:52830}   Flexor Digitoum Profundus C8-T1 Small DIP flex {AMB OT SHOULDER STRENGTH:76179}   Palmaris Brevis     Abductor Digiti Minimi C8- T1 MP abd {AMB OT SHOULDER STRENGTH:60121}   Opponens Digiti Minimi C8-T1 Opposition {AMB OT SHOULDER STRENGTH:52966}   Flexor Digiti Minimi C8-T1 MP flexion {AMB OT SHOULDER STRENGTH:07897}   Lumbrical C8- T1 Ring MP flexion/IP ext {AMB OT SHOULDER STRENGTH:81600}   Lumbrical C8- T1 Small MP flexion/IP ext {AMB OT SHOULDER STRENGTH:10671}   Palmar Interossei C8 - T1 First Add index {AMB OT SHOULDER STRENGTH:96144}   Palmar Interossei C8 -T1 Second Add ring {AMB OT SHOULDER STRENGTH:57382}   Palmar Interossei C8 - T1 Third Add small {AMB OT SHOULDER STRENGTH:46092}   Dorsal Interossei C8 - T1 First Abd index {AMB OT SHOULDER STRENGTH:27053}   Dorsal Interossei C8 - T1 Second Rad abd long {AMB OT SHOULDER STRENGTH:60970}   Dorsal Interossei C8 - T1 Third Uln abd long {AMB OT  "SHOULDER STRENGTH:28835}   Dorsal Interossei C8 - T1 Fourth Abd ring {AMB OT SHOULDER STRENGTH:78130}   Flexor Pollicis Brevis C7 - T1(deep head) Thumb MP flex {AMB OT SHOULDER STRENGTH:50802}   Adductor Pollicis C8- T1 Add Thumb {AMB OT SHOULDER STRENGTH:57880}     Radial Nerve Innervated:  Muscle Action 1/6/2025   Triceps C7-8 Elbow extension {AMB OT SHOULDER STRENGTH:44599}   Brachioradialis C5-6(mid -position) Elbow Flexion {AMB OT SHOULDER STRENGTH:82561}   Extensor Carpi Radialis Longus C6-7 Wrist ext/rd {AMB OT SHOULDER STRENGTH:54332}   Posterior Interosseous Branch     Extensor Carpi Radialis Brevis C6-7 Wrist extension {AMB OT SHOULDER STRENGTH:15232}   Supinator C6 Supination {AMB OT SHOULDER STRENGTH:89027}   Extensor Digitorum Communis C6-8 Index MP extension {AMB OT SHOULDER STRENGTH:74516}   Extensor Digitorum Communis C6-8 Long MP extension {AMB OT SHOULDER STRENGTH:02163}   Extensor Digitorum Communis C6-8 Ring MP extension {AMB OT SHOULDER STRENGTH:14667}   Extensor Digitorum Communis C6-8 Small MP extension {AMB OT SHOULDER STRENGTH:67255}   Extensor Digiti Minimi(small) MP extension {AMB OT SHOULDER STRENGTH:25665}   Extensor Carpi Ulnaris C6-8 Wrist ext/ud {AMB OT SHOULDER STRENGTH:44019}   Abductor Pollicis Longus C6-8 MP abd {AMB OT SHOULDER STRENGTH:25671}   Extensor Pollicis Longus C6-8 MP ext {AMB OT SHOULDER STRENGTH:18427}   Extensor Pollicis Brevis C6-8 Lt thumb off table {AMB OT SHOULDER STRENGTH:50194}   Extensor Indicis Proprius C6-8 MP ext {AMB OT SHOULDER STRENGTH:77304}       CMS Impairment/Limitation/Restriction for FOTO *** Survey    Therapist reviewed FOTO scores for Mary Healy on 1/6/2025.   FOTO documents entered into EPIC - see Media section.    Limitation Score: ***%  Category: {Blank single:49719::"Other","Self Care","Body Position","Carrying","Mobility"}         Treatment     Total Treatment time separate from Evaluation time:***    Mary received the treatments " "listed below:      Therapeutic exercises to develop {AMB PT PROGRESS OBJECTIVE:11388} for *** minutes including:  ***    Manual therapy techniques: {AMB PT PROGRESS MANUAL THERAPY:96574} were applied to the: *** for *** minutes, including:  ***    Neuromuscular re-education activities to improve: {AMB PT PROGRESS NEURO RE-ED:75324} for *** minutes. The following activities were included:  ***    Therapeutic activities to improve functional performance for ***  minutes, including:  ***      Direct contact modalities after being cleared for contraindications: {AMB PT PROGRESS DIRECT CONTACT MODES:19103}    Supervised modalities after being cleared for contradictions: {AMB PT SUPERVISED MODES:16039}    hot pack for *** minutes to ***.    cold pack for *** minutes to ***.    Patient Education and Home Exercises      Education provided:   -Role of OT, goals for OT, scheduling/cancellations, insurance limitations with patient.  -Additional Education provided: ***    Written Home Exercises Provided: {Blank single:75021::"yes","Patient instructed to cont prior HEP"}.  Exercises were reviewed and Mary was able to demonstrate them prior to the end of the session.    Mary demonstrated {Desc; good/fair/poor:84474} understanding of the education provided.     Pt was advised to perform these exercises free of pain, and to stop performing them if pain occurs.    See EMR under {Blank single:82881::"Media","Patient Instructions"} for exercises provided {Blank single:15430::"12/17/2024","prior visit"}.    Assessment     Mary Healy is a 59 y.o. female referred to outpatient occupational therapy and presents with a medical diagnosis of ***.    Following medical record review it is determined that pt will benefit from occupational therapy services in order to maximize pain free and/or functional use of {LEFT/RIGHT:33489} ***. The following goals were discussed with the patient and patient is in agreement with them as to be " addressed in the treatment plan. The patient's rehab potential is {REHAB PROGNOSIS OHS:76648}.     Anticipated barriers to Occupational Therapy: ***    Plan of care discussed with patient: {YES:38870}  Patient's spiritual, cultural and educational needs considered and patient is agreeable to the plan of care and goals as stated below:     Medical Necessity is demonstrated by the following  Occupational Profile/History  Co-morbidities and personal factors that may impact the plan of care [] LOW: Brief chart review  [] MODERATE: Expanded chart review   [] HIGH: Extensive chart review    Moderate / High Support Documentation: ***     Examination  Performance deficits relating to physical, cognitive or psychosocial skills that result in activity limitations and/or participation restrictions  [] LOW: addressing 1-3 Performance deficits  [] MODERATE: 3-5 Performance deficits  [] HIGH: 5+ Performance deficits (please support below)    Moderate / High Support Documentation:    Physical:  {Physical:69630}    Cognitive:  {Cognitive:42704}    Psychosocial:    {Psychosocial:95524}     Treatment Options [] LOW: Limited options  [] MODERATE: Several options  [] HIGH: Multiple options      Decision Making/ Complexity Score: {Desc; low/moderate/high:978027}       The following goals were discussed with the patient and patient is in agreement with them as to be addressed in the treatment plan.       ShortTerm Goals (to be met in *** weeks):   1. Patient to be IND and compliant with HEP & attendance for duration of therapy.  2. Patient will demonstrate increased LAGUNA in *** by *** degrees to restore functional hand use for ***.   3. Patient will demonstrate increased *** hand  strength by *** lbs. to improve functional grasp for ADLs/work/leisure activities.   4. Patient will demonstrate increased *** pinch by *** psi's to restore IND with fine motor activities.  5. Patient will report no more than ***/10 pain in *** hand.  "      Long Term Goals (to be met in *** weeks):   1. Patient to be IND and compliant with HEP & attendance for duration of therapy.  2. Patient will demonstrate increased LAGUNA in *** by *** degrees to increase functional hand use for ***.   3. Patient will demonstrate increased *** hand  strength by *** lbs. to restore functional grasp for ADLs/work/leisure activities.   4. Patient will demonstrate increased *** pinch by *** psi's to restore IND with fine motor activities.  5. Patient will report increase in functional use of *** hand by ***% as evidenced by the FOTO outcome measure.   6. Patient will report no more than ***/10 pain in *** hand.     Plan   Certification Period/Plan of care expiration: 1/6/2025 to ***.    Outpatient Occupational Therapy {NUMBERS 1-5:86513} times weekly for {0-10:76621::"0"} weeks to include the following interventions: {AMB OT HAND TX:53448}.        Thank you for allowing me to assist in the care of your Patient. If you have any questions or concerns, please don't hesitate to e-mail or contact me directly.      DEYSI Diaz/L  [facility]   Phone: [phone]  Fax: [phone]         "

## 2025-01-04 ENCOUNTER — PATIENT MESSAGE (OUTPATIENT)
Dept: ORTHOPEDICS | Facility: CLINIC | Age: 60
End: 2025-01-04
Payer: COMMERCIAL

## 2025-01-04 DIAGNOSIS — M77.8 THUMB TENDONITIS: Primary | ICD-10-CM

## 2025-01-04 DIAGNOSIS — M79.644 THUMB PAIN, RIGHT: ICD-10-CM

## 2025-01-04 DIAGNOSIS — M65.4 RADIAL STYLOID TENOSYNOVITIS (DE QUERVAIN): ICD-10-CM

## 2025-01-06 ENCOUNTER — LAB VISIT (OUTPATIENT)
Dept: LAB | Facility: HOSPITAL | Age: 60
End: 2025-01-06
Attending: INTERNAL MEDICINE
Payer: COMMERCIAL

## 2025-01-06 ENCOUNTER — CLINICAL SUPPORT (OUTPATIENT)
Dept: REHABILITATION | Facility: HOSPITAL | Age: 60
End: 2025-01-06
Payer: COMMERCIAL

## 2025-01-06 DIAGNOSIS — M25.641 DECREASED RANGE OF MOTION OF RIGHT THUMB: ICD-10-CM

## 2025-01-06 DIAGNOSIS — Z13.6 ENCOUNTER FOR LIPID SCREENING FOR CARDIOVASCULAR DISEASE: ICD-10-CM

## 2025-01-06 DIAGNOSIS — Z13.220 ENCOUNTER FOR LIPID SCREENING FOR CARDIOVASCULAR DISEASE: ICD-10-CM

## 2025-01-06 DIAGNOSIS — Z00.00 LABORATORY EXAMINATION ORDERED AS PART OF A ROUTINE GENERAL MEDICAL EXAMINATION: ICD-10-CM

## 2025-01-06 DIAGNOSIS — R29.898 DECREASED GRIP STRENGTH OF RIGHT HAND: ICD-10-CM

## 2025-01-06 DIAGNOSIS — M77.8 THUMB TENDONITIS: Primary | ICD-10-CM

## 2025-01-06 DIAGNOSIS — Z13.1 SCREENING FOR DIABETES MELLITUS: ICD-10-CM

## 2025-01-06 DIAGNOSIS — Z74.1 SELF-CARE DEFICIT IN DRESSING: ICD-10-CM

## 2025-01-06 DIAGNOSIS — I10 HYPERTENSION, WELL CONTROLLED: ICD-10-CM

## 2025-01-06 DIAGNOSIS — R29.898 DECREASED PINCH STRENGTH: ICD-10-CM

## 2025-01-06 LAB
ALBUMIN SERPL BCP-MCNC: 3.5 G/DL (ref 3.5–5.2)
ALP SERPL-CCNC: 50 U/L (ref 40–150)
ALT SERPL W/O P-5'-P-CCNC: 8 U/L (ref 10–44)
ANION GAP SERPL CALC-SCNC: 10 MMOL/L (ref 8–16)
AST SERPL-CCNC: 12 U/L (ref 10–40)
BASOPHILS # BLD AUTO: 0.02 K/UL (ref 0–0.2)
BASOPHILS NFR BLD: 0.4 % (ref 0–1.9)
BILIRUB SERPL-MCNC: 0.5 MG/DL (ref 0.1–1)
BUN SERPL-MCNC: 12 MG/DL (ref 6–20)
CALCIUM SERPL-MCNC: 9.1 MG/DL (ref 8.7–10.5)
CHLORIDE SERPL-SCNC: 108 MMOL/L (ref 95–110)
CHOLEST SERPL-MCNC: 203 MG/DL (ref 120–199)
CHOLEST/HDLC SERPL: 3.6 {RATIO} (ref 2–5)
CO2 SERPL-SCNC: 23 MMOL/L (ref 23–29)
CREAT SERPL-MCNC: 0.8 MG/DL (ref 0.5–1.4)
DIFFERENTIAL METHOD BLD: ABNORMAL
EOSINOPHIL # BLD AUTO: 0.1 K/UL (ref 0–0.5)
EOSINOPHIL NFR BLD: 1.5 % (ref 0–8)
ERYTHROCYTE [DISTWIDTH] IN BLOOD BY AUTOMATED COUNT: 14.3 % (ref 11.5–14.5)
EST. GFR  (NO RACE VARIABLE): >60 ML/MIN/1.73 M^2
ESTIMATED AVG GLUCOSE: 137 MG/DL (ref 68–131)
GLUCOSE SERPL-MCNC: 104 MG/DL (ref 70–110)
HBA1C MFR BLD: 6.4 % (ref 4–5.6)
HCT VFR BLD AUTO: 41.4 % (ref 37–48.5)
HDLC SERPL-MCNC: 56 MG/DL (ref 40–75)
HDLC SERPL: 27.6 % (ref 20–50)
HGB BLD-MCNC: 12.8 G/DL (ref 12–16)
IMM GRANULOCYTES # BLD AUTO: 0.01 K/UL (ref 0–0.04)
IMM GRANULOCYTES NFR BLD AUTO: 0.2 % (ref 0–0.5)
LDLC SERPL CALC-MCNC: 134.4 MG/DL (ref 63–159)
LYMPHOCYTES # BLD AUTO: 2.1 K/UL (ref 1–4.8)
LYMPHOCYTES NFR BLD: 43.3 % (ref 18–48)
MCH RBC QN AUTO: 26.7 PG (ref 27–31)
MCHC RBC AUTO-ENTMCNC: 30.9 G/DL (ref 32–36)
MCV RBC AUTO: 86 FL (ref 82–98)
MONOCYTES # BLD AUTO: 0.5 K/UL (ref 0.3–1)
MONOCYTES NFR BLD: 9.7 % (ref 4–15)
NEUTROPHILS # BLD AUTO: 2.1 K/UL (ref 1.8–7.7)
NEUTROPHILS NFR BLD: 44.9 % (ref 38–73)
NONHDLC SERPL-MCNC: 147 MG/DL
NRBC BLD-RTO: 0 /100 WBC
PLATELET # BLD AUTO: 252 K/UL (ref 150–450)
PMV BLD AUTO: 9.7 FL (ref 9.2–12.9)
POTASSIUM SERPL-SCNC: 4 MMOL/L (ref 3.5–5.1)
PROT SERPL-MCNC: 7.4 G/DL (ref 6–8.4)
RBC # BLD AUTO: 4.8 M/UL (ref 4–5.4)
SODIUM SERPL-SCNC: 141 MMOL/L (ref 136–145)
TRIGL SERPL-MCNC: 63 MG/DL (ref 30–150)
TSH SERPL DL<=0.005 MIU/L-ACNC: 2.56 UIU/ML (ref 0.4–4)
WBC # BLD AUTO: 4.73 K/UL (ref 3.9–12.7)

## 2025-01-06 PROCEDURE — 97110 THERAPEUTIC EXERCISES: CPT | Mod: PN

## 2025-01-06 PROCEDURE — 83036 HEMOGLOBIN GLYCOSYLATED A1C: CPT | Performed by: INTERNAL MEDICINE

## 2025-01-06 PROCEDURE — 36415 COLL VENOUS BLD VENIPUNCTURE: CPT | Mod: PO | Performed by: INTERNAL MEDICINE

## 2025-01-06 PROCEDURE — 84443 ASSAY THYROID STIM HORMONE: CPT | Performed by: INTERNAL MEDICINE

## 2025-01-06 PROCEDURE — 80061 LIPID PANEL: CPT | Performed by: INTERNAL MEDICINE

## 2025-01-06 PROCEDURE — 85025 COMPLETE CBC W/AUTO DIFF WBC: CPT | Performed by: INTERNAL MEDICINE

## 2025-01-06 PROCEDURE — 80053 COMPREHEN METABOLIC PANEL: CPT | Performed by: INTERNAL MEDICINE

## 2025-01-06 PROCEDURE — 97165 OT EVAL LOW COMPLEX 30 MIN: CPT | Mod: PN

## 2025-01-06 NOTE — PATIENT INSTRUCTIONS
"[facility] - OCCUP[facility]  HOME EXERCISE PROGRAM       Complete the following exercises with 15 repetitions each, 3 x/day.     AROM: Thumb IP Flexion / Extension  Brace thumb below tip joint. Bend joint as far as possible then straighten.    AROM: Thumb Composite Flexion   Bend both joints of thumb as far as possible. Try to touch base of little finger.    AROM: Radial Adduction / Abduction  Place your palm flat on the table. Move thumb out to side. Move back alongside index finger.                                  AROM: Palmar Adduction / Abduction   Rest your small finger on the table. Move thumb sideways, out and away from palm. Move back to rest along palm.                     AROM: Opposition   Touch tip of thumb to nail tip of each finger in turn, making an "O" shape.  AROM: Composite Movement Circumduction  Make clockwise circles with thumb. Reverse and make counterclockwise circles with thumb.                AROM: Composite Flesion ("Pinky Slides")  Touch thumb to tip of small finger. Slide thumb down small finger into palm.     AROM: MP Extension   With palm on table, lift thumb up. Relax and lower thumb.    Therapist: GUME Ruby     Copyright © [facility]. All rights reserved.    "

## 2025-01-06 NOTE — PLAN OF CARE
[facility] Occupational Therapy  Initial Evaluation     Date:  1/6/2025    Name: Mary Healy  Clinic Number: [id number]    Therapy Diagnosis:   1. Thumb tendonitis        2. Decreased range of motion of right thumb        3. Decreased  strength of right hand        4. Decreased pinch strength        5. Self-care deficit in dressing            Physician: Luther Draper PA-C     Physician Orders: eval and treat  Medical Diagnosis:    Thumb tendonitis   Thumb pain, right   Surgical Procedure and Date: n/a  Evaluation Date: 01/06/2025   Insurance Authorization Period Expiration: tbd  Plan of Care Certification Period: 01/06/2025   Date of Return to MD: not appointed    Visit # / Visits authorized: 1 / tbd    Time In: 9:34 am  Time Out: 10:27 am  Total Billable Time: 53 minutes    Precautions:  Standard    Subjective     Involved Side: right  Dominant Side: Right  Date of Onset: 3 - 4 weeks ago  Mechanism of Injury: Pulling up my spanks and twisted my hand and hand severe pain in my thumb  History of Current Condition: pain is still present with her hand and has difficulty with using splint.  Pain is much less but still present   Surgical Procedure: n/a  Imaging: xray   EXAMINATION:  XR FINGER 2 OR MORE VIEWS RIGHT  CLINICAL HISTORY:  R thumb pain at MCP joint x1 day.; Pain in right finger(s)  TECHNIQUE:  XR FINGER 2 OR MORE VIEWS RIGHT  COMPARISON:  None  FINDINGS:  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  No fracture or soft tissue swelling.  Impression:  See above  Electronically signed by:Dimitry Roberson Jr  Date:                                            12/05/2024    Previous Therapy: none    Past Medical History/Physical Systems Review:   Mary Healy  has a past medical history of Hypertension.    Mary Healy  has a past surgical history that includes Tubal ligation (Bilateral, 2007) and Colonoscopy (N/A, 3/19/2018).    Mary has a current medication list which includes the  following prescription(s): diclofenac sodium, gabapentin, hydrochlorothiazide, ibuprofen, triamcinolone acetonide 0.1%, and valacyclovir.    Review of patient's allergies indicates:  No Known Allergies     Patient's Goals for Therapy: To strengthen my thumb and no pain.    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    Functional Limitations/Social History:    Previous functional status includes: Independent with all ADLs.     Current FunctionalStatus   Home/Living environment : lives with their spouse      Limitation of Functional Status as follows:   ADLs/IADLs:     - Feeding: I    - Bathing: I  eggresing modified    - Dressing/Grooming: difficulty with spanks    - Driving: I     Leisure:  reality shows    Objective    Observation: Skin intact, no swelling or abnormalities noted    Sensation: Ulnar / Median Nerve Intact  [place] Doug Monofilament Test: No  Stereognosis: Intact    Special Tests:   Finkelstein's Test      Edema: Circumferential measurements: as follows:   MP's: left 18.8 right 19.3 cm  Proximal Wrist Crease: left 16.3  right 17  cm    Range of Motion: right and left Active  (Ext/Flex) Thumb left  Thumb right \     MP 0/49 0/41      DIP 0/77 0/64      LAGUNA 126 105        Thumb Opposition: to all tips and 5th MC head  Palmar Abduction: left 45  right 50  Radial Abduction: left 55  right 50    Wrist Ext/Flex: symmetric/symmetric  Wrist RD/UD: symmetric /symmetric  Supination/Pronation: symmetric/symmetric     Strength: (CHEN Dynamometer in lbs.) Average 3 trials, Position II  Right: 40.3#  Left: 44.0#    Pinch Strength: (Pinch Gauge in psi's),  Average 3 trials  Key Pinch R) 18.0 psi's   L) 15.6 psi's  3pt Pinch   R) 11 psi's  L) 11.3 psi's    Fine [place] Coordination Tests:   9 hole Peg Test R) 25 seconds 1 drop   L) 24 seconds no drops      Intake Outcome Measure  for FOTO hand  Survey    Therapist reviewed FOTO scores for Mary Healy on 1/6/2025.   FOTO documents entered into [employer] - see Media section.    Intake Score: 70%         Treatment     Treatment Time In: 10:17 am  Treatment Time Out: 10:27 am  Total Treatment time separate from Evaluation time:10    Mary received therapeutic exercises for 10 minutes including:  -performance of all exercises of her home program x 5 reps each    Home Exercise Program/Education:  Issued HEP (see patient instructions in EMR) and educated on modality use for pain management . Exercises were reviewed and Mary was able to demonstrate them prior to the end of the session.   Pt received a written copy of exercises to perform at home. Mary demonstrated good  understanding of the education provided.  Pt was advised to perform these exercises free of pain, and to stop performing them if pain occurs.    Patient/Family Education: role of OT, goals for OT, scheduling/cancellations - pt verbalized understanding. Discussed insurance limitations with patient.    Additional Education provided: use of ice or heat for 5 - 7 minutes for pain,  use of her soft splint support for work.    Assessment     Mary Healy is a 59 y.o. female referred to outpatient occupational therapy and presents with a medical diagnosis of  Thumb tendonitis and Thumb pain, right, resulting in Decreased ROM, Decreased  strength, Decreased pinch strength, and Increased pain and demonstrates limitations as described in the chart below. Following medical record review it is determined that pt will benefit from occupational therapy services in order to maximize pain free and/or functional use of right thumb/hand. The following goals were  discussed with the patient and patient is in agreement with them as to be addressed in the treatment plan. The patient's rehab potential is Good.     Anticipated barriers to occupational therapy: none noted  Pt has no cultural, educational or language barriers to learning provided.    Medical Necessity is demonstrated by the following  Occupational Profile/History  Co-morbidities and personal factors that may impact the plan of care [x] LOW: Brief chart review  [] MODERATE: Expanded chart review   [] HIGH: Extensive chart review    Moderate / High Support Documentation:      Examination  Performance deficits relating to physical, cognitive or psychosocial skills that result in activity limitations and/or participation restrictions  [x] LOW: addressing 1-3 Performance deficits  [] MODERATE: 3-5 Performance deficits  [] HIGH: 5+ Performance deficits (please support below)    Moderate / High Support Documentation:    Physical:  Joint Mobility   Strength  Pinch Strength  Fine Motor Coordination    Cognitive:  No Deficits    Psychosocial:    No Deficits     Treatment Options [x] LOW: Limited options  [] MODERATE: Several options  [] HIGH: Multiple options      Decision Making/ Complexity Score: low       The following goals were discussed with the patient and patient is in agreement with them as to be addressed in the treatment plan.     Goals:   Short term goals to be met in 4 weeks(2/3/25)  -Patient to be IND with HEP and modalities for pain/edema managment., -Increase ROM 3-5 degrees to increase functional hand use for ADLs/work/leisure activities.  -Increase  strength 3 lbs. to improve functional grasp for ADLs/work/leisure activities.   -Increase pinch 1 psi's to increase IND with button and fine motor Coordination.  -Decrease complaints of pain to  0 out of 10 to increase functional hand use for ADL/work/leisure activities.     Long term goals to be met by discharge:  Pt will report no pain with self care  donning of spanks  Pt will return to regular techniques with hair styling  Pt will increase right  strength to 50#   Pinch strength to improve to 14 psi's for work tasks     Plan   Certification Period/Plan of care expiration: 1/6/2025 to 3/17/25.    Outpatient Occupational Therapy 1 times weekly for 10 weeks to include the following interventions: Paraffin, Fluidotherapy, Manual therapy/joint mobilizations, Modalities for pain management, US 3 mhz, Therapeutic exercises/activities., and Strengthening.      GUME Ruby      I certify the need for these services furnished under this plan of treatment and while under my care    ____________________________________  Physician/Referring Practitioner    _______________  Date of Signature

## 2025-01-09 NOTE — PROGRESS NOTES
"[facility]  Occupational Therapy Treatment Note     Date: 1/13/2025  Name: Mary Elder [facility] Number: [id number]    Therapy Diagnosis:   Encounter Diagnoses   Name Primary?    Decreased range of motion of right thumb Yes    Decreased  strength of right hand     Self-care deficit in dressing      Physician: Luther Draper PA-C    Physician Orders: eval and treat  Medical Diagnosis:    Thumb tendonitis   Thumb pain, right   Surgical Procedure and Date: n/a  Evaluation Date: 01/06/2025   Insurance Authorization Period Expiration: tbd  Plan of Care Certification Period: 01/06/2025   Date of Return to MD: not appointed     Visit # / Visits authorized: 2/ tbd     Time In: 1:05 pm  Time Out: 1:45 pm  Total Billable Time: 40 minutes     Precautions:  Standard      Subjective     Patient reports: "I am doing okay,  I just feel stiff. I do feel it getting better."  She was compliant with home exercise program given last session.   Response to previous treatment: I was okay.  Functional change: nothing noted    Pain: 2/10  Location: right thumb stiffness     Objective     Objective Measures updated at progress report unless specified.    Treatment     Mary received the treatments listed below:      Mary received the following supervised modalities after being cleared for contradictions for 8 minutes:   Patient received fluidotherapy to right hand(s) for hand minutes to increase blood flow, circulation, desensitization, sensory re-education and for pain management.     therapeutic exercises to develop strength, ROM, and flexibility for 32 minutes including:  Passive thumb circumduction CW/ CCW 10 reps each  Active pinky slide with right thumb x 10 reps  Yellow therabar frowns and smiles x 10 reps each(slight difficulty)  Ultra gripper setting #1 removal of dowels from board x 21 reps   Red 3pt pinch clip to replace dowels into board x 21 reps  Gather 3 round top pegs to place in pegboard " individually      Patient Education and Home Exercises     Education provided:   - continue with HEP  - Progress towards goals     Written Home Exercises Provided: Pt instructed to continue prior HEP.  Exercises were reviewed and Mary was able to demonstrate them prior to the end of the session.  Mary demonstrated good  understanding of the home exercise program provided. See electronic medical record under Patient Instructions for exercises provided during therapy sessions.       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       Mary is progressing well towards her goals and there are no updates to goals at this time. Pt prognosis is Good.     Patient will continue to benefit from skilled outpatient occupational therapy to address the deficits listed in the problem list on initial evaluation provide patient/family education and to maximize patient's level of independence in the home and community environment.     Patient's spiritual, cultural and educational needs considered and patient agreeable to plan of care and goals.    Anticipated barriers to occupational therapy: non noted    Goals:  Short term goals to be met in 4 weeks(2/3/25)  -Patient to be IND with HEP and modalities for pain/edema managment., -Increase ROM 3-5 degrees to increase functional hand use for ADLs/work/leisure activities.  -Increase  strength 3 lbs. to improve functional grasp for ADLs/work/leisure activities.   -Increase pinch 1 psi's to increase IND with button and fine motor Coordination.  -Decrease complaints of pain to  0 out of 10 to increase functional hand use for ADL/work/leisure activities.      Long term goals to be met by discharge:  Pt will report no pain with self care donning of spanks  Pt will return to regular techniques with hair styling  Pt will increase right  strength to 50#   Pinch strength to improve to 14 psi's for work tasks     Plan    Performance of modalities prn. Active, progress with resistive exercises as tolerated to achieve goals  Updates/Grading for next session: prn    GUME Ruby   1/13/2025

## 2025-01-13 ENCOUNTER — CLINICAL SUPPORT (OUTPATIENT)
Dept: REHABILITATION | Facility: HOSPITAL | Age: 60
End: 2025-01-13
Payer: COMMERCIAL

## 2025-01-13 DIAGNOSIS — R29.898 DECREASED GRIP STRENGTH OF RIGHT HAND: ICD-10-CM

## 2025-01-13 DIAGNOSIS — Z74.1 SELF-CARE DEFICIT IN DRESSING: ICD-10-CM

## 2025-01-13 DIAGNOSIS — M25.641 DECREASED RANGE OF MOTION OF RIGHT THUMB: Primary | ICD-10-CM

## 2025-01-13 PROCEDURE — 97022 WHIRLPOOL THERAPY: CPT | Mod: PN

## 2025-01-13 PROCEDURE — 97110 THERAPEUTIC EXERCISES: CPT | Mod: PN

## 2025-01-27 ENCOUNTER — CLINICAL SUPPORT (OUTPATIENT)
Dept: REHABILITATION | Facility: HOSPITAL | Age: 60
End: 2025-01-27
Payer: COMMERCIAL

## 2025-01-27 DIAGNOSIS — R29.898 DECREASED GRIP STRENGTH OF RIGHT HAND: ICD-10-CM

## 2025-01-27 DIAGNOSIS — Z74.1 SELF-CARE DEFICIT IN DRESSING: ICD-10-CM

## 2025-01-27 DIAGNOSIS — M25.641 DECREASED RANGE OF MOTION OF RIGHT THUMB: Primary | ICD-10-CM

## 2025-01-27 PROCEDURE — 97110 THERAPEUTIC EXERCISES: CPT | Mod: PN

## 2025-01-27 NOTE — PROGRESS NOTES
"[facility]  Occupational Therapy Treatment Note     Date: 1/27/2025  Name: Mary Elder [facility] Number: [id number]    Therapy Diagnosis:   Encounter Diagnoses   Name Primary?    Decreased range of motion of right thumb Yes    Decreased  strength of right hand     Self-care deficit in dressing        Physician: Luther Draper PA-C    Physician Orders: eval and treat  Medical Diagnosis:    Thumb tendonitis   Thumb pain, right   Surgical Procedure and Date: n/a  Evaluation Date: 01/06/2025   Insurance Authorization Period Expiration: 12/31/25  Plan of Care Certification Period: 01/06/2025   Date of Return to MD: not appointed     Visit # / Visits authorized: 2/ 10     Time In: 1:27 pm  Time Out: 2:15 pm  Total Billable Time: 48 minutes     Precautions:  Standard    Subjective     Patient reports: "It is still feeling about the same."  She was compliant with home exercise program given last session.   Response to previous treatment: I was okay.  Functional change: nothing noted    Pain: 2/10  Location: right thumb stiffness     Objective     Objective Measures updated at progress report unless specified.    Treatment     Mary received the treatments listed below:      Mary received the following supervised modalities after being cleared for contradictions for 5 minutes:   Moist heat applied to right thumb/hand    therapeutic exercises to develop strength, ROM, and flexibility for 43 minutes including:  Rotation of small spheres in hand x 3 min  Yellow band radial and palmar abduction x 15 reps each  Ergo gripper with 1 yellow and red band x 3 min  Yellow putty gripping x 2 min  3pt pinch with yellow putty x 2 rolls   Yellow therabar frowns and smiles x 10 reps each  Flipping a deck of cards with right hand  Active thumb circumduction CW/ CCW 10 reps each  Red 3pt pinch clip remove and replace dowels into board x 42 reps    Patient Education and Home Exercises     Education provided: "   - continue with HEP  - Progress towards goals     Written Home Exercises Provided: Pt instructed to continue prior HEP.  Exercises were reviewed and Mary was able to demonstrate them prior to the end of the session.  Mary demonstrated good  understanding of the home exercise program provided. See electronic medical record under Patient Instructions for exercises provided during therapy sessions.       Assessment     Mary with intermittent pain in her right ulnar side of thumb at the MP joint which is the area of her osteoarthritis.  Intermittent reduction in her pain but then returns with various activities.  She reported improvement in her thumb post therapy session.        Mary is progressing well towards her goals and there are no updates to goals at this time. Pt prognosis is Good.     Patient will continue to benefit from skilled outpatient occupational therapy to address the deficits listed in the problem list on initial evaluation provide patient/family education and to maximize patient's level of independence in the home and community environment.     Patient's spiritual, cultural and educational needs considered and patient agreeable to plan of care and goals.    Anticipated barriers to occupational therapy: non noted    Goals:  Short term goals to be met in 4 weeks(2/3/25)  -Patient to be IND with HEP and modalities for pain/edema managment., -Increase ROM 3-5 degrees to increase functional hand use for ADLs/work/leisure activities.  -Increase  strength 3 lbs. to improve functional grasp for ADLs/work/leisure activities.   -Increase pinch 1 psi's to increase IND with button and fine motor Coordination.  -Decrease complaints of pain to  0 out of 10 to increase functional hand use for ADL/work/leisure activities.      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 Active, progress with resistive exercises as tolerated to achieve goals  Updates/Grading for next session: prn    GUME Ruby   1/27/2025

## 2025-01-27 NOTE — PATIENT INSTRUCTIONS
[facility]  OCCUPATIONAL THERAPY  HOME EXERCISE PROGRAM     Complete the following strengthening program 1x/day.              2 - 3 minutes           Complete 2 logs         GUME Ruby

## 2025-02-03 ENCOUNTER — CLINICAL SUPPORT (OUTPATIENT)
Dept: REHABILITATION | Facility: HOSPITAL | Age: 60
End: 2025-02-03
Payer: COMMERCIAL

## 2025-02-03 DIAGNOSIS — M25.641 DECREASED RANGE OF MOTION OF RIGHT THUMB: Primary | ICD-10-CM

## 2025-02-03 DIAGNOSIS — Z74.1 SELF-CARE DEFICIT IN DRESSING: ICD-10-CM

## 2025-02-03 DIAGNOSIS — R29.898 DECREASED GRIP STRENGTH OF RIGHT HAND: ICD-10-CM

## 2025-02-03 PROCEDURE — 97018 PARAFFIN BATH THERAPY: CPT | Mod: PN

## 2025-02-03 PROCEDURE — 97110 THERAPEUTIC EXERCISES: CPT | Mod: PN

## 2025-02-03 NOTE — PROGRESS NOTES
"[facility]  Occupational Therapy Progress / Treatment Note     Date: 2/3/2025  Name: Mary [facility]  Clinic Number: [id number]    Therapy Diagnosis:   Encounter Diagnoses   Name Primary?    Decreased range of motion of right thumb Yes    Decreased  strength of right hand     Self-care deficit in dressing      Physician: Luther Draper PA-C    Physician Orders: eval and treat  Medical Diagnosis:    Thumb tendonitis   Thumb pain, right   Surgical Procedure and Date: n/a  Evaluation Date: 01/06/2025   Insurance Authorization Period Expiration: 12/31/25  Plan of Care Certification Period: 01/06/2025   Date of Return to MD: not appointed     Visit # / Visits authorized: 4/ 10     Time In: 1:00 pm  Time Out: 1:43 pm  Total Billable Time: 38 minutes     Precautions:  Standard  \  Subjective     Patient reports: "My hand is feeling better, I don't think it will ever be normal."  She was compliant with home exercise program given last session.   Response to previous treatment: I was okay.  Functional change: nothing noted    Pain: 0/10  Location: right thumb stiffness     Objective     Objective Measures updated at progress report unless specified.  Assessment:  Range of Motion: right and left Active                            1/6/25                2/3/25  (Ext/Flex) Thumb left  Thumb right Thumb right       MP 0/49 0/41  0/46       DIP 0/77 0/64  0/78       LAGUNA 126 105  134          Thumb Opposition: to all tips and 5th MC head                                       1/6/25                 2/3/25  Palmar Abduction: left 45  right 50        R   Radial Abduction: left 55  right 50         R  55     Wrist Ext/Flex: symmetric/symmetric  Wrist RD/UD: symmetric /symmetric  Supination/Pronation: symmetric/symmetric      Strength: (CHEN Dynamometer in lbs.) Average 3 trials, Position II              1/6/25      2/3/25  Right: 40.3#          34.4#  Left: 44.0#     Pinch Strength: (Pinch Gauge in " psi's), Average 3 trials  Key Pinch 1/6/25 R) 18.0 psi's   L) 15.6 psi's                    2/3/25 R)  17.6 psi's  3pt Pinch   1/6/25 R) 11 psi's  L) 11.3 psi's                     2/3/25 R) 13.6 psi's           Treatment     Mary received the treatments listed below:      Mary received the following supervised modalities after being cleared for contradictions until cooled:   Patient received paraffin bath to right hand(s)  to increase blood flow, circulation, pain management and for tissue elasticity prior to therex.      therapeutic exercises to develop strength, ROM, and flexibility for 38 minutes including assessment and exercises  Ergo gripper 2 red bands x 4 min  Rotation of large spheres in hand x 3 min  Yellow band radial and palmar abduction x 15 reps each  Yellow therabar frowns and smiles x 10 reps each  Flipping a deck of cards with right hand  Active thumb circumduction CW/ CCW 10 reps each  Red 3pt pinch clip remove and replace dowels into board x 42 reps    Patient Education and Home Exercises     Education provided:   - continue with HEP  - Progress towards goals     Written Home Exercises Provided: Pt instructed to continue prior HEP.  Exercises were reviewed and Mary was able to demonstrate them prior to the end of the session.  Mary demonstrated good  understanding of the home exercise program provided. See electronic medical record under Patient Instructions for exercises provided during therapy sessions.       Assessment     Mary with improvement in her thumb pain today although pain still present with work tasks.  Improvement in 3pt pinch, active range of motion of right thumb and radial abduction.        Mary is progressing well towards her goals and there are no updates to goals at this time. Pt prognosis is Good.     Patient will continue to benefit from skilled outpatient occupational therapy to address the deficits listed in the problem list on initial evaluation provide  patient/family education and to maximize patient's level of independence in the home and community environment.     Patient's spiritual, cultural and educational needs considered and patient agreeable to plan of care and goals.    Anticipated barriers to occupational therapy: non noted    Goals:  Short term goals to be met in 4 weeks(2/3/25)  -Patient to be IND with HEP and modalities for pain/edema managment.,   -Increase ROM 3-5 degrees to increase functional hand use for ADLs/work/leisure activities- met 2/3/25.  -Increase  strength 3 lbs. to improve functional grasp for ADLs/work/leisure activities.   -Increase pinch 1 psi's to increase IND with button and fine motor Coordination- met with 3pt pinch 2/3/25  -Decrease complaints of pain to  0 out of 10 to increase functional hand use for ADL/work/leisure activities- intermittently met.      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 Active, progress with resistive exercises as tolerated to achieve goals  Updates/Grading for next session: prn    GUME Ruby   2/3/2025

## 2025-02-07 NOTE — PROGRESS NOTES
"[facility]  Occupational Therapy Treatment Note     Date: 2/10/2025  Name: Mary Elder [facility] Number: [id number]    Therapy Diagnosis:   No diagnosis found.  Encounter Diagnoses   Name Primary?    Decreased range of motion of right thumb Yes    Decreased  strength of right hand     Self-care deficit in dressing      Physician: Luther Draper PA-C    Physician Orders: eval and treat  Medical Diagnosis:    Thumb tendonitis   Thumb pain, right   Surgical Procedure and Date: n/a  Evaluation Date: 01/06/2025   Insurance Authorization Period Expiration: 12/31/25  Plan of Care Certification Period: 01/06/2025   Date of Return to MD: not appointed     Visit # / Visits authorized: 5/ 10     Time In: 1:10 pm  Time Out: 1:57 pm  Total Billable Time: 47  minutes     Precautions:  Standard  \  Subjective     Patient reports: "I am still guarding my hand with pulling tight clothing on. "  She was compliant with home exercise program given last session.   Response to previous treatment: I was okay.  Functional change: nothing noted    Pain: 0/10  Location: right thumb stiffness     Objective     Objective Measures updated at progress report unless specified.  Assessment:  Range of Motion: right and left Active                            1/6/25                2/3/25  (Ext/Flex) Thumb left  Thumb right Thumb right       MP 0/49 0/41  0/46       DIP 0/77 0/64  0/78       LAGUNA 126 105  134          Thumb Opposition: to all tips and 5th MC head                                       1/6/25                 2/3/25  Palmar Abduction: left 45  right 50        R   Radial Abduction: left 55  right 50         R  55     Wrist Ext/Flex: symmetric/symmetric  Wrist RD/UD: symmetric /symmetric  Supination/Pronation: symmetric/symmetric      Strength: (CHEN Dynamometer in lbs.) Average 3 trials, Position II              1/6/25      2/3/25  Right: 40.3#          34.4#  Left: 44.0#     Pinch Strength: (Pinch " Gauge in psi's), Average 3 trials  Key Pinch 1/6/25 R) 18.0 psi's   L) 15.6 psi's                    2/3/25 R)  17.6 psi's  3pt Pinch   1/6/25 R) 11 psi's  L) 11.3 psi's                     2/3/25 R) 13.6 psi's           Treatment     Mary received the treatments listed below:      Mary received the following supervised modalities after being cleared for contradictions until cooled:   Patient received paraffin bath to right hand(s)  to increase blood flow, circulation, pain management and for tissue elasticity prior to therex.      Manual therapy: soft tissue management of first dorsal 12  Massage muscle belly followed with stretching     therapeutic exercises to develop strength, ROM, and flexibility for  37 minutes including assessment and exercises  Green clip placement of 20 round top pegs in board                    Adduction of thumb x 2 minutes  Red therabar smiles and frowns x 15 reps  Yellow putty  three point pinch x 3 min   Right thumb palmar abduction x 15                      Radial abduction x 15  Yellow band radial and palmar abduction x 15 reps each    Patient Education and Home Exercises     Education provided:   - continue with HEP  - Progress towards goals     Written Home Exercises Provided: Pt instructed to continue prior HEP.  Exercises were reviewed and Mary was able to demonstrate them prior to the end of the session.  Mary demonstrated good  understanding of the home exercise program provided. See electronic medical record under Patient Instructions for exercises provided during therapy sessions.       Assessment     Mary with improvement in her thumb use of right hand for tasks. No pain during session.  Resistive pinch pull for tight clothing is still bothersome but not as intense.    Mary is progressing well towards her goals and there are no updates to goals at this time. Pt prognosis is Good.     Patient will continue to benefit from skilled outpatient occupational therapy to  address the deficits listed in the problem list on initial evaluation provide patient/family education and to maximize patient's level of independence in the home and community environment.     Patient's spiritual, cultural and educational needs considered and patient agreeable to plan of care and goals.    Anticipated barriers to occupational therapy: non noted    Goals:  Short term goals to be met in 4 weeks(2/3/25)  -Patient to be IND with HEP and modalities for pain/edema managment.,   -Increase ROM 3-5 degrees to increase functional hand use for ADLs/work/leisure activities- met 2/3/25.  -Increase  strength 3 lbs. to improve functional grasp for ADLs/work/leisure activities.   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 Active, progress with resistive exercises as tolerated to achieve goals  Updates/Grading for next session: prn    GUME Ruby   2/10/2025

## 2025-02-10 ENCOUNTER — CLINICAL SUPPORT (OUTPATIENT)
Dept: REHABILITATION | Facility: HOSPITAL | Age: 60
End: 2025-02-10
Payer: COMMERCIAL

## 2025-02-10 DIAGNOSIS — Z74.1 SELF-CARE DEFICIT IN DRESSING: ICD-10-CM

## 2025-02-10 DIAGNOSIS — M25.641 DECREASED RANGE OF MOTION OF RIGHT THUMB: Primary | ICD-10-CM

## 2025-02-10 DIAGNOSIS — R29.898 DECREASED GRIP STRENGTH OF RIGHT HAND: ICD-10-CM

## 2025-02-10 PROCEDURE — 97140 MANUAL THERAPY 1/> REGIONS: CPT | Mod: PN

## 2025-02-10 PROCEDURE — 97110 THERAPEUTIC EXERCISES: CPT | Mod: PN

## 2025-02-10 PROCEDURE — 97018 PARAFFIN BATH THERAPY: CPT | Mod: PN

## 2025-02-12 NOTE — PROGRESS NOTES
"[facility]  Occupational Therapy Treatment Note / Discharge     Date: 2/17/2025  Name: Mary Elder [place]  Clinic Number: [id number]    Therapy Diagnosis:   1. Decreased range of motion of right thumb        2. Decreased  strength of right hand        3. Self-care deficit in dressing           Physician: Luther Draper PA-C    Physician Orders: eval and treat  Medical Diagnosis:    Thumb tendonitis   Thumb pain, right   Surgical Procedure and Date: n/a  Evaluation Date: 01/06/2025   Insurance Authorization Period Expiration: 12/31/25  Plan of Care Certification Period: 01/06/2025   Date of Return to MD: not appointed     Visit # / Visits authorized: 6/ 10     Time In: 1:05 pm  Time Out: 1:45 pm  Total Billable Time: 40 minutes     Precautions:  Standard    Date of last visit: 02/17/2025   Total number of therapy visits:  6  Subjective     Patient reports: "I think I am ready to finish therapy. "  She was compliant with home exercise program given last session.   Response to previous treatment: no problems  Functional change: nothing new    Pain: 0/10  Location: right thumb stiffness     Objective     Objective Measures updated at progress report unless specified.  Assessment:  Range of Motion: right and left Active                            1/6/25                2/3/25       2/17/25  (Ext/Flex) Thumb left  Thumb right Thumb right  thumb right      MP 0/49 0/41  0/46  0/50     DIP 0/77 0/64  0/78  0/76     LAGUNA 126 105  134  136        Thumb Opposition: to all tips and 5th MC head                                       1/6/25                 2/3/25    2/17/25  Palmar Abduction: left 45  right 50        R   Radial Abduction: left 55  right 50         R  55         60     Wrist Ext/Flex: symmetric/symmetric  Wrist RD/UD: symmetric /symmetric  Supination/Pronation: symmetric/symmetric      Strength: (CHEN Dynamometer in lbs.) Average 3 trials, Position II              1/6/25      2/3/25  "     2/17/25  Right: 40.3#          34.4#         47.4 #  Left: 44.0#     Pinch Strength: (Pinch Gauge in psi's), Average 3 trials  Key Pinch 1/6/25 R) 18.0 psi's   L) 15.6 psi's                    2/3/25 R)  17.6 psi's                    2/17/25 R)  20 psi's  3pt Pinch   1/6/25 R) 11 psi's  L) 11.3 psi's                     2/3/25 R) 13.6 psi's                            2/18/25 R) 16 psi's    Treatment     Mary received the treatments listed below:      Mary received the following supervised modalities after being cleared for contradictions until cooled:   Patient received paraffin bath to right hand(s)  to increase blood flow, circulation, pain management and for tissue elasticity prior to therex.       therapeutic exercises to develop strength, ROM, and flexibility for  35 minutes including: assessment and exercises  Massage muscle belly followed with stretching  Green clip 3pt pinch placement of HiQ pieces in board  x 23               Green therabar smiles and frowns x 10 reps  Yellow band thumb abduction x 10 reps each    Patient Education and Home Exercises     Education provided:   - continue with HEP  - Progress towards goals     Written Home Exercises Provided: Pt instructed to continue prior HEP.  Exercises were reviewed and Mary was able to demonstrate them prior to the end of the session.  Mary demonstrated good  understanding of the home exercise program provided. See electronic medical record under Patient Instructions for exercises provided during therapy sessions.       Assessment     Mary has met all of her therapy goals with improvement in functional use of  right hand and modification for tasks that created thumb pain.    Patient's spiritual, cultural and educational needs considered and patient agreeable to plan of care and goals.    Anticipated barriers to occupational therapy: non noted    Goals:  Short term goals to be met in 4 weeks(2/3/25)  -Patient to be IND with HEP and modalities for  pain/edema managment.,   -Increase ROM 3-5 degrees to increase functional hand use for ADLs/work/leisure activities- met 2/3/25.  -Increase  strength 3 lbs. to improve functional grasp for ADLs/work/leisure activities.   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    GUME Ruby   2/17/2025

## 2025-02-17 ENCOUNTER — CLINICAL SUPPORT (OUTPATIENT)
Dept: REHABILITATION | Facility: HOSPITAL | Age: 60
End: 2025-02-17
Payer: COMMERCIAL

## 2025-02-17 DIAGNOSIS — M25.641 DECREASED RANGE OF MOTION OF RIGHT THUMB: Primary | ICD-10-CM

## 2025-02-17 DIAGNOSIS — R29.898 DECREASED GRIP STRENGTH OF RIGHT HAND: ICD-10-CM

## 2025-02-17 DIAGNOSIS — Z74.1 SELF-CARE DEFICIT IN DRESSING: ICD-10-CM

## 2025-02-17 PROCEDURE — 97018 PARAFFIN BATH THERAPY: CPT | Mod: PN

## 2025-02-17 PROCEDURE — 97110 THERAPEUTIC EXERCISES: CPT | Mod: PN

## 2025-03-13 ENCOUNTER — HOSPITAL ENCOUNTER (OUTPATIENT)
Dept: RADIOLOGY | Facility: HOSPITAL | Age: 60
Discharge: HOME | End: 2025-03-13
Attending: INTERNAL MEDICINE
Payer: COMMERCIAL

## 2025-03-13 DIAGNOSIS — Z12.31 BREAST CANCER SCREENING BY MAMMOGRAM: ICD-10-CM

## 2025-03-13 PROCEDURE — 77067 SCR MAMMO BI INCL CAD: CPT | Mod: TC

## 2025-03-13 PROCEDURE — 77067 SCR MAMMO BI INCL CAD: CPT | Mod: 26,,, | Performed by: RADIOLOGY

## 2025-03-13 PROCEDURE — 77063 BREAST TOMOSYNTHESIS BI: CPT | Mod: 26,,, | Performed by: RADIOLOGY
